# Patient Record
Sex: MALE | Race: WHITE | Employment: FULL TIME | URBAN - METROPOLITAN AREA
[De-identification: names, ages, dates, MRNs, and addresses within clinical notes are randomized per-mention and may not be internally consistent; named-entity substitution may affect disease eponyms.]

---

## 2017-01-09 DIAGNOSIS — E78.00 HYPERCHOLESTEROLEMIA: ICD-10-CM

## 2017-01-09 RX ORDER — ATORVASTATIN CALCIUM 20 MG/1
20 TABLET, FILM COATED ORAL DAILY
Qty: 30 TAB | Refills: 0 | Status: SHIPPED | OUTPATIENT
Start: 2017-01-09 | End: 2017-02-13 | Stop reason: SDUPTHER

## 2017-01-09 RX ORDER — FENOFIBRATE 200 MG/1
200 CAPSULE ORAL
Qty: 30 CAP | Refills: 0 | Status: SHIPPED | OUTPATIENT
Start: 2017-01-09 | End: 2017-02-13 | Stop reason: SDUPTHER

## 2017-01-09 NOTE — TELEPHONE ENCOUNTER
Spoke with pt's wife, informed her that pt has not been seen by his PCP Ender Medrano since 04/2015. He is due for an appointment.  Wife states pt will schedule an appt however he is out of medication and needs asap

## 2017-03-13 DIAGNOSIS — E78.00 HYPERCHOLESTEROLEMIA: ICD-10-CM

## 2017-03-13 RX ORDER — ATORVASTATIN CALCIUM 20 MG/1
TABLET, FILM COATED ORAL
Qty: 30 TAB | Refills: 0 | Status: SHIPPED | OUTPATIENT
Start: 2017-03-13

## 2017-03-13 RX ORDER — FENOFIBRATE 200 MG/1
CAPSULE ORAL
Qty: 30 CAP | Refills: 0 | Status: SHIPPED | OUTPATIENT
Start: 2017-03-13

## 2018-03-26 ENCOUNTER — HOSPITAL ENCOUNTER (OUTPATIENT)
Age: 48
Setting detail: OBSERVATION
Discharge: HOME | End: 2018-03-27
Attending: EMERGENCY MEDICINE | Admitting: FAMILY MEDICINE
Payer: COMMERCIAL

## 2018-03-26 ENCOUNTER — APPOINTMENT (OUTPATIENT)
Dept: GENERAL RADIOLOGY | Age: 48
End: 2018-03-26
Attending: PHYSICIAN ASSISTANT
Payer: COMMERCIAL

## 2018-03-26 DIAGNOSIS — R07.9 CHEST PAIN, UNSPECIFIED TYPE: Primary | ICD-10-CM

## 2018-03-26 LAB
ALBUMIN SERPL-MCNC: 3.8 G/DL (ref 3.5–5)
ALBUMIN/GLOB SERPL: 1 {RATIO} (ref 1.1–2.2)
ALP SERPL-CCNC: 60 U/L (ref 45–117)
ALT SERPL-CCNC: 42 U/L (ref 12–78)
ANION GAP SERPL CALC-SCNC: 6 MMOL/L (ref 5–15)
AST SERPL-CCNC: 32 U/L (ref 15–37)
BASOPHILS # BLD: 0 K/UL (ref 0–0.1)
BASOPHILS NFR BLD: 0 % (ref 0–1)
BILIRUB SERPL-MCNC: 0.2 MG/DL (ref 0.2–1)
BUN SERPL-MCNC: 15 MG/DL (ref 6–20)
BUN/CREAT SERPL: 12 (ref 12–20)
CALCIUM SERPL-MCNC: 8.5 MG/DL (ref 8.5–10.1)
CHLORIDE SERPL-SCNC: 107 MMOL/L (ref 97–108)
CO2 SERPL-SCNC: 26 MMOL/L (ref 21–32)
CREAT SERPL-MCNC: 1.3 MG/DL (ref 0.7–1.3)
D DIMER PPP FEU-MCNC: 0.22 MG/L FEU (ref 0–0.65)
DIFFERENTIAL METHOD BLD: NORMAL
EOSINOPHIL # BLD: 0.1 K/UL (ref 0–0.4)
EOSINOPHIL NFR BLD: 2 % (ref 0–7)
ERYTHROCYTE [DISTWIDTH] IN BLOOD BY AUTOMATED COUNT: 12.9 % (ref 11.5–14.5)
GLOBULIN SER CALC-MCNC: 3.7 G/DL (ref 2–4)
GLUCOSE SERPL-MCNC: 103 MG/DL (ref 65–100)
HCT VFR BLD AUTO: 44.2 % (ref 36.6–50.3)
HGB BLD-MCNC: 14.8 G/DL (ref 12.1–17)
IMM GRANULOCYTES # BLD: 0 K/UL (ref 0–0.04)
IMM GRANULOCYTES NFR BLD AUTO: 0 % (ref 0–0.5)
LYMPHOCYTES # BLD: 1.7 K/UL (ref 0.8–3.5)
LYMPHOCYTES NFR BLD: 21 % (ref 12–49)
MCH RBC QN AUTO: 29.8 PG (ref 26–34)
MCHC RBC AUTO-ENTMCNC: 33.5 G/DL (ref 30–36.5)
MCV RBC AUTO: 89.1 FL (ref 80–99)
MONOCYTES # BLD: 0.7 K/UL (ref 0–1)
MONOCYTES NFR BLD: 9 % (ref 5–13)
NEUTS SEG # BLD: 5.5 K/UL (ref 1.8–8)
NEUTS SEG NFR BLD: 68 % (ref 32–75)
NRBC # BLD: 0 K/UL (ref 0–0.01)
NRBC BLD-RTO: 0 PER 100 WBC
PLATELET # BLD AUTO: 299 K/UL (ref 150–400)
PMV BLD AUTO: 10.7 FL (ref 8.9–12.9)
POTASSIUM SERPL-SCNC: 4.1 MMOL/L (ref 3.5–5.1)
PROT SERPL-MCNC: 7.5 G/DL (ref 6.4–8.2)
RBC # BLD AUTO: 4.96 M/UL (ref 4.1–5.7)
SODIUM SERPL-SCNC: 139 MMOL/L (ref 136–145)
TROPONIN I SERPL-MCNC: <0.04 NG/ML
WBC # BLD AUTO: 8.1 K/UL (ref 4.1–11.1)

## 2018-03-26 PROCEDURE — 94640 AIRWAY INHALATION TREATMENT: CPT

## 2018-03-26 PROCEDURE — 74011250637 HC RX REV CODE- 250/637: Performed by: FAMILY MEDICINE

## 2018-03-26 PROCEDURE — 71046 X-RAY EXAM CHEST 2 VIEWS: CPT

## 2018-03-26 PROCEDURE — 77010033678 HC OXYGEN DAILY

## 2018-03-26 PROCEDURE — 85379 FIBRIN DEGRADATION QUANT: CPT | Performed by: PHYSICIAN ASSISTANT

## 2018-03-26 PROCEDURE — 36415 COLL VENOUS BLD VENIPUNCTURE: CPT | Performed by: PHYSICIAN ASSISTANT

## 2018-03-26 PROCEDURE — 85025 COMPLETE CBC W/AUTO DIFF WBC: CPT | Performed by: PHYSICIAN ASSISTANT

## 2018-03-26 PROCEDURE — 77030029684 HC NEB SM VOL KT MONA -A

## 2018-03-26 PROCEDURE — 84484 ASSAY OF TROPONIN QUANT: CPT | Performed by: PHYSICIAN ASSISTANT

## 2018-03-26 PROCEDURE — 93005 ELECTROCARDIOGRAM TRACING: CPT

## 2018-03-26 PROCEDURE — 99218 HC RM OBSERVATION: CPT

## 2018-03-26 PROCEDURE — 80053 COMPREHEN METABOLIC PANEL: CPT | Performed by: PHYSICIAN ASSISTANT

## 2018-03-26 PROCEDURE — 99284 EMERGENCY DEPT VISIT MOD MDM: CPT

## 2018-03-26 PROCEDURE — 96374 THER/PROPH/DIAG INJ IV PUSH: CPT

## 2018-03-26 PROCEDURE — 74011250637 HC RX REV CODE- 250/637: Performed by: EMERGENCY MEDICINE

## 2018-03-26 PROCEDURE — 94664 DEMO&/EVAL PT USE INHALER: CPT

## 2018-03-26 PROCEDURE — 74011000250 HC RX REV CODE- 250: Performed by: FAMILY MEDICINE

## 2018-03-26 RX ORDER — FENOFIBRATE 145 MG/1
145 TABLET, COATED ORAL DAILY
Status: DISCONTINUED | OUTPATIENT
Start: 2018-03-27 | End: 2018-03-27 | Stop reason: HOSPADM

## 2018-03-26 RX ORDER — SODIUM CHLORIDE 0.9 % (FLUSH) 0.9 %
5-10 SYRINGE (ML) INJECTION AS NEEDED
Status: DISCONTINUED | OUTPATIENT
Start: 2018-03-26 | End: 2018-03-27 | Stop reason: HOSPADM

## 2018-03-26 RX ORDER — VENLAFAXINE HYDROCHLORIDE 37.5 MG/1
75 CAPSULE, EXTENDED RELEASE ORAL
Status: DISCONTINUED | OUTPATIENT
Start: 2018-03-26 | End: 2018-03-27 | Stop reason: HOSPADM

## 2018-03-26 RX ORDER — ACETAMINOPHEN 325 MG/1
650 TABLET ORAL
Status: DISCONTINUED | OUTPATIENT
Start: 2018-03-26 | End: 2018-03-27 | Stop reason: HOSPADM

## 2018-03-26 RX ORDER — SODIUM CHLORIDE 0.9 % (FLUSH) 0.9 %
5-10 SYRINGE (ML) INJECTION EVERY 8 HOURS
Status: DISCONTINUED | OUTPATIENT
Start: 2018-03-26 | End: 2018-03-27 | Stop reason: HOSPADM

## 2018-03-26 RX ORDER — BISOPROLOL FUMARATE 5 MG/1
5 TABLET ORAL DAILY
Status: DISCONTINUED | OUTPATIENT
Start: 2018-03-27 | End: 2018-03-27 | Stop reason: HOSPADM

## 2018-03-26 RX ORDER — NITROGLYCERIN 0.4 MG/1
0.4 TABLET SUBLINGUAL
Status: DISCONTINUED | OUTPATIENT
Start: 2018-03-26 | End: 2018-03-27 | Stop reason: HOSPADM

## 2018-03-26 RX ORDER — IPRATROPIUM BROMIDE AND ALBUTEROL SULFATE 2.5; .5 MG/3ML; MG/3ML
3 SOLUTION RESPIRATORY (INHALATION)
Status: DISCONTINUED | OUTPATIENT
Start: 2018-03-26 | End: 2018-03-27 | Stop reason: HOSPADM

## 2018-03-26 RX ORDER — GUAIFENESIN 100 MG/5ML
324 LIQUID (ML) ORAL
Status: COMPLETED | OUTPATIENT
Start: 2018-03-26 | End: 2018-03-26

## 2018-03-26 RX ORDER — GUAIFENESIN 100 MG/5ML
81 LIQUID (ML) ORAL
Status: DISCONTINUED | OUTPATIENT
Start: 2018-03-26 | End: 2018-03-27 | Stop reason: HOSPADM

## 2018-03-26 RX ORDER — GUAIFENESIN 100 MG/5ML
81 LIQUID (ML) ORAL DAILY
Status: DISCONTINUED | OUTPATIENT
Start: 2018-03-27 | End: 2018-03-26 | Stop reason: SDUPTHER

## 2018-03-26 RX ORDER — ATORVASTATIN CALCIUM 20 MG/1
20 TABLET, FILM COATED ORAL DAILY
Status: DISCONTINUED | OUTPATIENT
Start: 2018-03-27 | End: 2018-03-27 | Stop reason: HOSPADM

## 2018-03-26 RX ADMIN — ASPIRIN 81 MG 81 MG: 81 TABLET ORAL at 22:59

## 2018-03-26 RX ADMIN — VENLAFAXINE HYDROCHLORIDE 75 MG: 37.5 CAPSULE, EXTENDED RELEASE ORAL at 23:06

## 2018-03-26 RX ADMIN — FAMOTIDINE 20 MG: 10 INJECTION INTRAVENOUS at 22:59

## 2018-03-26 RX ADMIN — IPRATROPIUM BROMIDE AND ALBUTEROL SULFATE 3 ML: .5; 3 SOLUTION RESPIRATORY (INHALATION) at 22:45

## 2018-03-26 RX ADMIN — ASPIRIN 81 MG 324 MG: 81 TABLET ORAL at 17:06

## 2018-03-26 RX ADMIN — Medication 10 ML: at 23:03

## 2018-03-26 NOTE — IP AVS SNAPSHOT
[address] [place] [address] 
[phone] Patient: Bronwyn Johnson MRN: [id number] :1970 About your hospitalization You were admitted on:  2018 You last received care in the:  [facility] 4 IMCU You were discharged on:  2018 Why you were hospitalized Your primary diagnosis was:  Chest Pain Follow-up Information Follow up With Details Comments Contact Info Alesha Garcia NP In 1 week post hospitalization follow up [address] [place] [address] 
[phone] Your Scheduled Appointments 2018  8:15 AM EDT [facility] NM 2 DAY CARD REST DOSE with [facility] NM INJ RM 1 [facility] RAD NUC MED ([address]) [address]  
[phone] Patient should report to outpatient registration ([address] [place] [address]) 30 minutes prior to the appointment time unless instructed otherwise. (NOT FOR MRI) Discharge Orders None A check kathy indicates which time of day the medication should be taken. My Medications CHANGE how you take these medications Instructions Each Dose to Equal  
 Morning Noon Evening Bedtime  
 atorvastatin 20 mg tablet Commonly known as:  LIPITOR What changed:  See the new instructions. Your last dose was: Your next dose is: TAKE ONE TABLET BY MOUTH DAILY  
     
   
   
   
  
 fenofibrate micronized 200 mg capsule Commonly known as:  LOFIBRA What changed:  See the new instructions. Your last dose was: Your next dose is: TAKE ONE CAPSULE BY MOUTH EVERY MORNING  
     
   
   
   
  
  
CONTINUE taking these medications Instructions Each Dose to Equal  
 Morning Noon Evening Bedtime  
 aspirin 81 mg chewable tablet Your last dose was: Your next dose is: Take 81 mg by mouth nightly.   
 81 mg  
    
 bisoprolol 5 mg tablet Commonly known as:  Vesta Balloon Your last dose was: Your next dose is: TAKE ONE TABLET BY MOUTH NIGHTLY  
     
   
   
   
  
 venlafaxine-SR 75 mg capsule Commonly known as:  EFFEXOR-XR Your last dose was: Your next dose is: Take 1 Cap by mouth nightly. 75 mg Discharge Instructions Discharge Instructions PATIENT ID: Bobbi Allen MRN: [id number] YOB: 1970 DATE OF ADMISSION: 3/26/2018  4:18 PM   
DATE OF DISCHARGE: 3/27/2018 PRIMARY CARE PROVIDER: Margo Gonsalez NP  
 
ATTENDING PHYSICIAN: Megan Hopkins MD 
DISCHARGING PROVIDER: Megan Hopkins MD   
To contact this individual call [phone] and ask the  to page. If unavailable ask to be transferred the Adult Hospitalist Department. DISCHARGE DIAGNOSES and ADDITIONAL CARE RECOMMENDATIONS:  
-Chest pain. No evidence of heart attack. All est are negative. CONSULTATIONS: IP CONSULT TO HOSPITALIST 
 
PROCEDURES/SURGERIES: * No surgery found * PENDING TEST RESULTS:  
At the time of discharge the following test results are still pending: none FOLLOW UP APPOINTMENTS:  
Follow-up Information Follow up With Details Comments Contact Info Margo Gonsalez NP In 1 week post hospitalization follow up [address] 
[phone] DIET: Regular Diet ACTIVITY: Activity as tolerated DISCHARGE MEDICATIONS: 
 See Medication Reconciliation Form · It is important that you take the medication exactly as they are prescribed. · Keep your medication in the bottles provided by the pharmacist and keep a list of the medication names, dosages, and times to be taken in your wallet. · Do not take other medications without consulting your doctor. NOTIFY YOUR PHYSICIAN FOR ANY OF THE FOLLOWING:  
Fever over 101 degrees for 24 hours. Chest pain, shortness of breath, fever, chills, nausea, vomiting, diarrhea, change in mentation, falling, weakness, bleeding. Severe pain or pain not relieved by medications. Or, any other signs or symptoms that you may have questions about. DISPOSITION: 
x  Home With: 
 OT  PT  [place]  RN  
  
 SNF/Inpatient Rehab/LTAC Independent/assisted living Hospice Other:  
 
 
 
 
Signed: Nolan Holland MD 
3/27/2018 
3:07 PM 
 
  
  
  
Introducing [facility]! Dear Kody Frazier: Thank you for requesting a [employer] account. Our records indicate that you already have an active [employer] account. You can access your account anytime at [email or web address] Did you know that you can access your hospital and ER discharge instructions at any time in [employer]? You can also review all of your test results from your hospital stay or ER visit. Additional Information If you have questions, please visit the Frequently Asked Questions section of the [employer] website at [email or web address] Remember, [employer] is NOT to be used for urgent needs. For medical emergencies, dial 911. Now available from your iPhone and Android! Introducing Krzysztof Casanova As a [facility] patient, I wanted to make you aware of our electronic visit tool called Krzysztof Casanova. [facility] 24/7 allows you to connect within minutes with a medical provider 24 hours a day, seven days a week via a mobile device or tablet or logging into a secure website from your computer. You can access Krzysztof Recinosfin from anywhere in the [place].  
 
A virtual visit might be right for you when you have a simple condition and feel like you just dont want to get out of bed, or cant get away from work for an appointment, when your regular [facility] provider is not available (evenings, weekends or holidays), or when youre out of town and need minor care. Electronic visits cost only $49 and if the [employer] 24/7 provider determines a prescription is needed to treat your condition, one can be electronically transmitted to a nearby pharmacy*. Please take a moment to enroll today if you have not already done so. The enrollment process is free and takes just a few minutes. To enroll, please download the disco volante kirti to your tablet or phone, or visit [email or web address] to enroll on your computer. And, as an [address] [place] [address] patient with a "[employer] account, the results of your visits will be scanned into your electronic medical record and your primary care provider will be able to view the scanned results. We urge you to continue to see your regular [facility] provider for your ongoing medical care. And while your primary care provider may not be the one available when you seek a [email or web address] virtual visit, the peace of mind you get from getting a real diagnosis real time can be priceless. For more information on [email or web address], view our Frequently Asked Questions (FAQs) at [email or web address]. Sincerely, 
 
Kiet Rivera MD 
Chief Medical Officer [employer] *:  certain medications cannot be prescribed via [facility] Providers Seen During Your Hospitalization Provider Specialty Primary office phone Buck Carvalho MD Emergency Medicine [phone] Joanne Lozano MD Hospitalist [phone] Khushbu Gunter MD Internal Medicine [phone] Your Primary Care Physician (PCP) Primary Care Physician Office Phone Office Fax José Miguel Torres [phone] You are allergic to the following Allergen Reactions Buspar (Buspirone) Unable to Obtain Lipitor (Atorvastatin) Unable to Obtain Pcn (Penicillins) Unknown (comments) Patient was told this since his childhood Zocor (Simvastatin) Unable to Obtain Recent Documentation Height Weight BMI Smoking Status 1.803 m 127.2 kg 39.11 kg/m2 Former Smoker Emergency Contacts Name Discharge Info Relation Home Work Mobile Lisseth Alston DISCHARGE CAREGIVER [3] Spouse [3] [phone] Patient Belongings The following personal items are in your possession at time of discharge: 
  Dental Appliances: None  Visual Aid: Glasses, With patient      Home Medications: None   Jewelry: None  Clothing: Belt, Footwear, Pants, Shirt    Other Valuables: Cell Phone Please provide this summary of care documentation to your next provider. Signatures-by signing, you are acknowledging that this After Visit Summary has been reviewed with you and you have received a copy. Patient Signature:  ____________________________________________________________ Date:  ____________________________________________________________  
  
Micki Garcia Provider Signature:  ____________________________________________________________ Date:  ____________________________________________________________

## 2018-03-26 NOTE — IP AVS SNAPSHOT
[address] 
[phone] Patient: Bronwyn Johnson MRN: [id number] :1970 A check kathy indicates which time of day the medication should be taken. My Medications CHANGE how you take these medications Instructions Each Dose to Equal  
 Morning Noon Evening Bedtime  
 atorvastatin 20 mg tablet Commonly known as:  LIPITOR What changed:  See the new instructions. Your last dose was: Your next dose is: TAKE ONE TABLET BY MOUTH DAILY  
     
   
   
   
  
 fenofibrate micronized 200 mg capsule Commonly known as:  LOFIBRA What changed:  See the new instructions. Your last dose was: Your next dose is: TAKE ONE CAPSULE BY MOUTH EVERY MORNING  
     
   
   
   
  
  
CONTINUE taking these medications Instructions Each Dose to Equal  
 Morning Noon Evening Bedtime  
 aspirin 81 mg chewable tablet Your last dose was: Your next dose is: Take 81 mg by mouth nightly. 81 mg  
    
   
   
   
  
 bisoprolol 5 mg tablet Commonly known as:  Aria Patel Your last dose was: Your next dose is: TAKE ONE TABLET BY MOUTH NIGHTLY  
     
   
   
   
  
 venlafaxine-SR 75 mg capsule Commonly known as:  EFFEXOR-XR Your last dose was: Your next dose is: Take 1 Cap by mouth nightly.   
 75 mg

## 2018-03-26 NOTE — ED PROVIDER NOTES
HPI Comments: 49 y.o. male with past medical history significant for GERD, anxiety, asthma, and hypercholesterolemia who presents ambulatory from work with chief complaint of chest pain. Pt reports sudden onset L sided CP radiating down his L arm described as sharp and burning with accompanying lightheadedness and \"feeling hot\" about 2 hours PTA (~1430) while at work typing. Pt reports at its worst, the pain was a 5/10. Pt reports the accompanying symptoms have resolved, but the pain is currently a 2/10. Pt reports he felt the need to cough and was mildly SOB with the pain, but not currently. Pt reports a history of anxiety attacks, notes this does not feel similar to past attacks. Pt denies having similar pain before. Pt reports a history of HTN, high cholesterol, and anxiety. There are no other acute medical concerns at this time. Social hx: Former smoker (Quit 2011); Occasional EtOH use  FMHx: Father had 3 MIs prior to age 72  PCP: Pedro Shipley NP    Note written by Jami Newton, as dictated by Margarette Shell MD 4:51 PM      The history is provided by the patient. No  was used. Past Medical History:   Diagnosis Date    Anxiety 4/23/2010    AR (allergic rhinitis) 4/23/2010    Asthma 4/23/2010    Family history of early CAD 4/23/2010    GERD (gastroesophageal reflux disease) 4/23/2010    Hypercholesterolemia 6/6/02    Obesity 4/23/2010    Smoker        Past Surgical History:   Procedure Laterality Date    HX CHOLECYSTECTOMY  5/03         Family History:   Problem Relation Age of Onset    Lung Disease Mother      COPD    Arthritis-osteo Father     Heart Disease Father     Elevated Lipids Father     Stroke Father     Cancer Father      bone marrow; multiple myeloma       Social History     Social History    Marital status:      Spouse name: N/A    Number of children: N/A    Years of education: N/A     Occupational History    Not on file. Social History Main Topics    Smoking status: Former Smoker     Years: 12.00     Types: Cigarettes     Quit date: 1/1/2011    Smokeless tobacco: Former User      Comment: down to 4 cigarettes a day    Alcohol use Yes      Comment: occ    Drug use: No    Sexual activity: Yes     Partners: Female     Other Topics Concern    Not on file     Social History Narrative         ALLERGIES: Buspar [buspirone]; Lipitor [atorvastatin]; Pcn [penicillins]; and Zocor [simvastatin]    Review of Systems   Constitutional: Negative for activity change, chills and fever. HENT: Negative for nosebleeds, sore throat, trouble swallowing and voice change. Eyes: Negative for visual disturbance. Respiratory: Negative for cough (Resolved) and shortness of breath (Resolved). Cardiovascular: Positive for chest pain. Negative for palpitations. Gastrointestinal: Negative for abdominal pain, constipation, diarrhea and nausea. Genitourinary: Negative for difficulty urinating, dysuria, hematuria and urgency. Musculoskeletal: Negative for back pain, neck pain and neck stiffness. Skin: Negative for color change. Allergic/Immunologic: Negative for immunocompromised state. Neurological: Negative for dizziness, seizures, syncope, weakness, light-headedness (Resolved), numbness and headaches. Psychiatric/Behavioral: Negative for behavioral problems, confusion, hallucinations, self-injury and suicidal ideas. All other systems reviewed and are negative. Vitals:    03/26/18 1449   BP: (!) 145/92   Pulse: 77   Resp: 18   Temp: 97.7 °F (36.5 °C)   SpO2: 97%   Weight: 130.4 kg (287 lb 6.4 oz)   Height: 5' 11\" (1.803 m)            Physical Exam   Constitutional: He is oriented to person, place, and time. He appears well-developed. No distress. Obese. HENT:   Head: Normocephalic and atraumatic. Eyes: Pupils are equal, round, and reactive to light. Neck: Normal range of motion. Neck supple.    Cardiovascular: Normal rate, regular rhythm and normal heart sounds. Exam reveals no gallop and no friction rub. No murmur heard. Pulmonary/Chest: Effort normal and breath sounds normal. No respiratory distress. He has no wheezes. Abdominal: Soft. Bowel sounds are normal. He exhibits no distension. There is no tenderness. There is no rebound and no guarding. Musculoskeletal: Normal range of motion. Neurological: He is alert and oriented to person, place, and time. Skin: Skin is warm. No rash noted. He is not diaphoretic. Psychiatric: He has a normal mood and affect. His behavior is normal. Judgment and thought content normal.   Nursing note and vitals reviewed. Note written by Jami Riggins, as dictated by Benton Girard MD 4:51 PM    [facility]      ED Course     This is a 67-year-old male with past medical history, review of systems, physical exam as above, presenting with complaints of acute onset chest pain, characterize as burning, 5/10, radiating down the left arm, associated with flushing, diaphoresis, shortness of breath, that his since improved to pain of  1-2/10, without associated symptoms. Patient states he has had anxiety attacks in the past, but feel different, endorses cardiac evaluation by stress testing approximately 3 years ago, with normal findings. Patient has history significant for hypertension, hyperlipidemia, former smoker, strong family history of coronary disease. Physical exam remarkable for obese well-appearing middle-aged male in no acute distress, with clear breath sounds, soft nontender abdomen, regular rate and rhythm without murmurs gallops or rubs. Differential includes pulmonary embolism, acute coronary syndrome, pneumonia, anxiety. Plan to obtain CMP, CBC, EKG, cardiac enzymes, d-dimer, and chest x-ray. We will provide pain control, and make a disposition based on the patient's diagnostics and response to therapy.   Patient Heart Score is 5.      5:26 PM    Recommending admission for this patient given his risk factors and presentation. Patient is concerned his insurance did not cover his stay. Will consult case management for assistance in evaluating his insurance and possible transfer to a excepting hospital.      Procedures      CONSULT NOTE:  7:15 PM João Rodriguez MD spoke with Dr. Aj Bernstein, Consult for Hospitalist.  Discussed available diagnostic tests and clinical findings. Dr. Aj Bernstein will see and admit the pt.

## 2018-03-26 NOTE — ED NOTES

## 2018-03-26 NOTE — PROGRESS NOTES
Received consult. Verified with registration that pt has open access [employer] plan.       ROSEMARIE Galeano

## 2018-03-26 NOTE — PROGRESS NOTES
Admission Medication Reconciliation:    Information obtained from: patient    Significant PMH/Disease States:   Past Medical History:   Diagnosis Date    Anxiety 4/23/2010    AR (allergic rhinitis) 4/23/2010    Asthma 4/23/2010    Family history of early CAD 4/23/2010    GERD (gastroesophageal reflux disease) 4/23/2010    Hypercholesterolemia 6/6/02    Obesity 4/23/2010    Smoker        Chief Complaint for this Admission:  chest pain    Allergies:  Buspar [buspirone]; Lipitor [atorvastatin]; Pcn [penicillins]; and Zocor [simvastatin]    Prior to Admission Medications:   Prior to Admission Medications   Prescriptions Last Dose Informant Patient Reported? Taking?   aspirin 81 mg chewable tablet 3/25/2018 at pm  Yes Yes   Sig: Take 81 mg by mouth nightly. atorvastatin (LIPITOR) 20 mg tablet 3/25/2018 at pm  No Yes   Sig: TAKE ONE TABLET BY MOUTH DAILY   Patient taking differently: TAKE ONE TABLET BY MOUTH nightly   bisoprolol (ZEBETA) 5 mg tablet 3/25/2018 at pm  No Yes   Sig: TAKE ONE TABLET BY MOUTH NIGHTLY   fenofibrate micronized (LOFIBRA) 200 mg capsule 3/25/2018 at pm  No Yes   Sig: TAKE ONE CAPSULE BY MOUTH EVERY MORNING   Patient taking differently: TAKE ONE CAPSULE BY MOUTH EVERY evening   venlafaxine-SR (EFFEXOR-XR) 75 mg capsule 3/25/2018 at pm  No Yes   Sig: Take 1 Cap by mouth nightly. Facility-Administered Medications: None       Comments/Recommendations: Medication review done with patient. Removed QVAR inhaler. Patient takes all his medications before bed. Nothing taken today PTA.

## 2018-03-26 NOTE — ED TRIAGE NOTES
Triage: Pt c/o chest tightness, L arm sore and feeling \"flushed\". Denies N/V, fevers.  +SOB, dry cough

## 2018-03-27 ENCOUNTER — APPOINTMENT (OUTPATIENT)
Dept: NUCLEAR MEDICINE | Age: 48
End: 2018-03-27
Attending: HOSPITALIST
Payer: COMMERCIAL

## 2018-03-27 VITALS
HEIGHT: 71 IN | WEIGHT: 280.43 LBS | TEMPERATURE: 98.5 F | SYSTOLIC BLOOD PRESSURE: 148 MMHG | DIASTOLIC BLOOD PRESSURE: 87 MMHG | OXYGEN SATURATION: 99 % | RESPIRATION RATE: 18 BRPM | BODY MASS INDEX: 39.26 KG/M2 | HEART RATE: 73 BPM

## 2018-03-27 LAB
ANION GAP SERPL CALC-SCNC: 9 MMOL/L (ref 5–15)
ATRIAL RATE: 71 BPM
ATTENDING PHYSICIAN, CST07: NORMAL
BASOPHILS # BLD: 0 K/UL (ref 0–0.1)
BASOPHILS NFR BLD: 0 % (ref 0–1)
BUN SERPL-MCNC: 18 MG/DL (ref 6–20)
BUN/CREAT SERPL: 16 (ref 12–20)
CALCIUM SERPL-MCNC: 8.8 MG/DL (ref 8.5–10.1)
CALCULATED P AXIS, ECG09: 21 DEGREES
CALCULATED R AXIS, ECG10: -32 DEGREES
CALCULATED T AXIS, ECG11: 16 DEGREES
CHLORIDE SERPL-SCNC: 108 MMOL/L (ref 97–108)
CHOLEST SERPL-MCNC: 135 MG/DL
CO2 SERPL-SCNC: 23 MMOL/L (ref 21–32)
CREAT SERPL-MCNC: 1.11 MG/DL (ref 0.7–1.3)
DIAGNOSIS, 93000: NORMAL
DIAGNOSIS, 93000: NORMAL
DIFFERENTIAL METHOD BLD: NORMAL
DUKE TM SCORE RESULT, CST14: NORMAL
DUKE TREADMILL SCORE, CST13: NORMAL
ECG INTERP BEFORE EX, CST11: NORMAL
ECG INTERP DURING EX, CST12: NORMAL
EOSINOPHIL # BLD: 0.2 K/UL (ref 0–0.4)
EOSINOPHIL NFR BLD: 2 % (ref 0–7)
ERYTHROCYTE [DISTWIDTH] IN BLOOD BY AUTOMATED COUNT: 12.9 % (ref 11.5–14.5)
FUNCTIONAL CAPACITY, CST17: NORMAL
GLUCOSE SERPL-MCNC: 139 MG/DL (ref 65–100)
HCT VFR BLD AUTO: 39.9 % (ref 36.6–50.3)
HDLC SERPL-MCNC: 32 MG/DL
HDLC SERPL: 4.2 {RATIO} (ref 0–5)
HGB BLD-MCNC: 13.4 G/DL (ref 12.1–17)
IMM GRANULOCYTES # BLD: 0 K/UL (ref 0–0.04)
IMM GRANULOCYTES NFR BLD AUTO: 0 % (ref 0–0.5)
KNOWN CARDIAC CONDITION, CST08: NORMAL
LDLC SERPL CALC-MCNC: 75.8 MG/DL (ref 0–100)
LIPID PROFILE,FLP: NORMAL
LYMPHOCYTES # BLD: 2.1 K/UL (ref 0.8–3.5)
LYMPHOCYTES NFR BLD: 23 % (ref 12–49)
MAX. DIASTOLIC BP, CST04: 92 MMHG
MAX. HEART RATE, CST05: 150 BPM
MAX. SYSTOLIC BP, CST03: 190 MMHG
MCH RBC QN AUTO: 29.7 PG (ref 26–34)
MCHC RBC AUTO-ENTMCNC: 33.6 G/DL (ref 30–36.5)
MCV RBC AUTO: 88.5 FL (ref 80–99)
MONOCYTES # BLD: 1 K/UL (ref 0–1)
MONOCYTES NFR BLD: 11 % (ref 5–13)
NEUTS SEG # BLD: 5.9 K/UL (ref 1.8–8)
NEUTS SEG NFR BLD: 64 % (ref 32–75)
NRBC # BLD: 0 K/UL (ref 0–0.01)
NRBC BLD-RTO: 0 PER 100 WBC
OVERALL BP RESPONSE TO EXERCISE, CST16: NORMAL
OVERALL HR RESPONSE TO EXERCISE, CST15: NORMAL
P-R INTERVAL, ECG05: 208 MS
PEAK EX METS, CST10: 10.1 METS
PLATELET # BLD AUTO: 271 K/UL (ref 150–400)
PMV BLD AUTO: 10.5 FL (ref 8.9–12.9)
POTASSIUM SERPL-SCNC: 3.5 MMOL/L (ref 3.5–5.1)
PROTOCOL NAME, CST01: NORMAL
Q-T INTERVAL, ECG07: 404 MS
QRS DURATION, ECG06: 92 MS
QTC CALCULATION (BEZET), ECG08: 439 MS
RBC # BLD AUTO: 4.51 M/UL (ref 4.1–5.7)
SODIUM SERPL-SCNC: 140 MMOL/L (ref 136–145)
TEST INDICATION, CST09: NORMAL
TRIGL SERPL-MCNC: 136 MG/DL (ref ?–150)
TROPONIN I SERPL-MCNC: <0.04 NG/ML
TSH SERPL DL<=0.05 MIU/L-ACNC: 3.52 UIU/ML (ref 0.36–3.74)
VENTRICULAR RATE, ECG03: 71 BPM
VLDLC SERPL CALC-MCNC: 27.2 MG/DL
WBC # BLD AUTO: 9.2 K/UL (ref 4.1–11.1)

## 2018-03-27 PROCEDURE — 84484 ASSAY OF TROPONIN QUANT: CPT | Performed by: FAMILY MEDICINE

## 2018-03-27 PROCEDURE — 85025 COMPLETE CBC W/AUTO DIFF WBC: CPT | Performed by: FAMILY MEDICINE

## 2018-03-27 PROCEDURE — 36415 COLL VENOUS BLD VENIPUNCTURE: CPT | Performed by: FAMILY MEDICINE

## 2018-03-27 PROCEDURE — 74011000250 HC RX REV CODE- 250: Performed by: FAMILY MEDICINE

## 2018-03-27 PROCEDURE — 94640 AIRWAY INHALATION TREATMENT: CPT

## 2018-03-27 PROCEDURE — 93017 CV STRESS TEST TRACING ONLY: CPT

## 2018-03-27 PROCEDURE — 99218 HC RM OBSERVATION: CPT

## 2018-03-27 PROCEDURE — 80061 LIPID PANEL: CPT | Performed by: FAMILY MEDICINE

## 2018-03-27 PROCEDURE — 84443 ASSAY THYROID STIM HORMONE: CPT | Performed by: FAMILY MEDICINE

## 2018-03-27 PROCEDURE — 80048 BASIC METABOLIC PNL TOTAL CA: CPT | Performed by: FAMILY MEDICINE

## 2018-03-27 PROCEDURE — 96376 TX/PRO/DX INJ SAME DRUG ADON: CPT

## 2018-03-27 PROCEDURE — 74011250637 HC RX REV CODE- 250/637: Performed by: FAMILY MEDICINE

## 2018-03-27 PROCEDURE — 93306 TTE W/DOPPLER COMPLETE: CPT

## 2018-03-27 RX ORDER — SODIUM CHLORIDE 0.9 % (FLUSH) 0.9 %
20 SYRINGE (ML) INJECTION
Status: COMPLETED | OUTPATIENT
Start: 2018-03-27 | End: 2018-03-27

## 2018-03-27 RX ORDER — FAMOTIDINE 20 MG/1
20 TABLET, FILM COATED ORAL EVERY 12 HOURS
Status: DISCONTINUED | OUTPATIENT
Start: 2018-03-27 | End: 2018-03-27 | Stop reason: HOSPADM

## 2018-03-27 RX ADMIN — IPRATROPIUM BROMIDE AND ALBUTEROL SULFATE 3 ML: .5; 3 SOLUTION RESPIRATORY (INHALATION) at 08:41

## 2018-03-27 RX ADMIN — FAMOTIDINE 20 MG: 10 INJECTION INTRAVENOUS at 09:22

## 2018-03-27 RX ADMIN — Medication 10 ML: at 07:19

## 2018-03-27 RX ADMIN — BISOPROLOL FUMARATE 5 MG: 5 TABLET ORAL at 10:30

## 2018-03-27 RX ADMIN — Medication 20 ML: at 11:15

## 2018-03-27 RX ADMIN — IPRATROPIUM BROMIDE AND ALBUTEROL SULFATE 3 ML: .5; 3 SOLUTION RESPIRATORY (INHALATION) at 14:35

## 2018-03-27 RX ADMIN — Medication 10 ML: at 13:49

## 2018-03-27 RX ADMIN — FENOFIBRATE 145 MG: 145 TABLET ORAL at 09:16

## 2018-03-27 RX ADMIN — ATORVASTATIN CALCIUM 20 MG: 20 TABLET, FILM COATED ORAL at 09:16

## 2018-03-27 NOTE — H&P
[address] [facility]  ACUTE CARE HISTORY AND PHYSICAL    Lourdes Carroll.  MR#: [id number]  : 1970  ACCOUNT #: [de-identified]   DATE OF SERVICE: 2018    CHIEF COMPLAINT:  Chest pain. HISTORY OF PRESENT ILLNESS:  Patient is a 79-year-old male with past medical history of GERD, anxiety, asthma, hypercholesterolemia, presents to the hospital complaining of the aforementioned symptoms. Patient reports that he started having some chest pain on the left side of his chest, radiating down to the left forearm, with some \"lightheadedness,\" \"feeling hot\" at around 2:30 this afternoon. The patient reports that he was sitting when he started having the sudden onset of chest pain. Reports that he has history of some panic attacks, but has never had \"symptoms like this with panic attack. \"  The patient got concerned and decided to come to the hospital.  Patient also reports that he has a mild cough, which is nonproductive in nature, associated with the same. Also reports that he had some shortness of breath but, again, denies any exertional components of the same. Patient denies any palpitations, diaphoresis, any exacerbating or alleviating factors, or any heartburn associated with the pain. Patient reports that he had a stress test done about 4-5 years back that was essentially negative. Also reports that he has been taking his medication on a regular basis. Patient denies any headache, blurry vision, sore throat, trouble with swallowing, trouble with speech, any abdominal pain, constipation, diarrhea, hematemesis, melena, hemoptysis, nausea, vomiting, urinary symptoms, focal or generalized neurological weakness, recent travel, sick contacts, falls, injuries, or any other concerns or problems. Patient denies any fever or chills associated with the symptoms. PAST MEDICAL HISTORY:  See above.     HOME MEDICATIONS:  Currently the patient is on Lipitor 20 mg daily, apparently it is listed as an allergy, but patient reports that he is able to take the same; Lofibra 200 mg p.o. daily; Effexor 75 mg nightly; Risperdal 5 mg p.o. nightly; and aspirin 81 mg daily. SOCIAL HISTORY:  Former smoker, used to smoke for 12 years, quit in 2011. Occasional alcohol use. Denies IV drug use. Lives at home. ALLERGIES:  BUSPAR, LIPITOR, PENICILLIN, AND ZOCOR. Again, the patient reports that he has been taking Lipitor since the last year and has had no adverse reactions associated with the same, and he has been taking it on a regular basis. REVIEW OF SYSTEMS:  All systems were reviewed and were found to be essentially negative except for symptoms mentioned above. FAMILY HISTORY:  Mother had a history of COPD. Father had history of osteoarthritis, heart disease, elevated lipids, stroke, and multiple myeloma. PHYSICAL EXAMINATION:  VITAL SIGNS:  Temperature 98, pulse 73, respiration rate 16, blood pressure 140/78, pulse ox 98% on room air. GENERAL:  Alert x3, awake, mildly distressed, pleasant man, appears to be stated age. HEENT:  Pupils were equal and reactive to light. Dry mucous membranes. Tympanic membranes clear. NECK:  Supple. CHEST:  Decreased basilar breath sounds. CORONARY:  S1, S2 were heard. ABDOMEN:  Soft, nontender, nondistended. Bowel sounds are physiological.   EXTREMITIES:  No clubbing, no cyanosis, no edema. NEUROPSYCHIATRIC:  Pleasant mood and affect. Cranial nerves II-XII grossly intact. Sensory grossly within normal limits. DTRs 2+. Strength 5/5. LABORATORY DATA:  White count 8.1, hemoglobin 14.8, hematocrit 44.2, platelets 528. D-dimer 2.22. Sodium 139, potassium 4.1, chloride 107, bicarbonate 26, anion gap 6, glucose 103, BUN 15, creatinine 1.30, calcium 8.5, bilirubin total 0.2, ALT 42, AST 32, alkaline phosphatase 60. Troponin less than 0.04.      X-ray of the chest shows no acute abnormality.       EKG shows normal sinus rhythm, left axis deviation. ASSESSMENT AND PLAN:  1. Chest pain, rule out acute coronary syndrome. Patient will be admitted on a telemetry bed. We will continue the patient on aspirin and statin  We will cycle cardiac enzymes x3 sets. The pain appears to be atypical.  Will get an echocardiogram, telemetry monitoring, nitroglycerin, and further intervention will be per hospital course. If patient rules out, then patient may need a stress test on an outpatient basis, and I do not think the patient needs a stress test on an inpatient basis at this point of time. We will continue to closely monitor, further intervention will be per hospital course. Reassess as needed. EKG shows no acute ST elevation at this point of time. 2.  Asthma. Does not appear to be in exacerbation. Continue to monitor. DuoNeb p.r.n.  3.  Anxiety. Continue home medications. 4.  Hypercholesterolemia. Continue statin. 5.  Gastrointestinal and deep venous thrombosis prophylaxis, the patient on sequential compression devices.       Bessie Looney MD MM / TONIA  D: 03/26/2018 20:20     T: 03/26/2018 21:17  JOB #: [id number]

## 2018-03-27 NOTE — PROGRESS NOTES
Hospital PCP LUCAS GODFREY appointment scheduled with [facility], NP  On Tuesday April 3, 2018  @ 2:00 p.m.  Added to AVS. Jena Garcia CM Specialist

## 2018-03-27 NOTE — ROUTINE PROCESS
TRANSFER - OUT REPORT:    Verbal report given to Angela Kumar RN(name) on [facility]  being transferred to [facility]) for routine progression of care       Report consisted of patients Situation, Background, Assessment and   Recommendations(SBAR). Information from the following report(s) SBAR, ED Summary, [facility] and Recent Results was reviewed with the receiving nurse. Lines:   Peripheral IV 03/26/18 Right Hand (Active)   Site Assessment Clean, dry, & intact 3/26/2018  3:12 PM   Phlebitis Assessment 0 3/26/2018  3:12 PM   Infiltration Assessment 0 3/26/2018  3:12 PM   Dressing Status Clean, dry, & intact 3/26/2018  3:12 PM   Dressing Type 4 X 4;Tape;Transparent 3/26/2018  3:12 PM   Hub Color/Line Status Pink; Infusing;Flushed;Patent 3/26/2018  3:12 PM        Opportunity for questions and clarification was provided.       Patient transported with:   transport

## 2018-03-27 NOTE — PROGRESS NOTES
Bedside shift change report given to Day Clark RN (oncoming nurse) by Jose Pinzon RN (offgoing nurse). Report included the following information SBAR, Kardex, ED Summary, MAR, Accordion, Recent Results and Cardiac Rhythm NSR.

## 2018-03-27 NOTE — PROGRESS NOTES
Spiritual Care Partner Volunteer visited patient in Rm 410 on 3/27/2018.   Documented by:  Chaplain Cantu MDiv, MS, [address]  [address] [place] [address] ([id number])

## 2018-03-27 NOTE — DISCHARGE INSTRUCTIONS
Discharge Instructions       PATIENT ID: Jarod Sharma  MRN: [id number]   YOB: 1970    DATE OF ADMISSION: 3/26/2018  4:18 PM    DATE OF DISCHARGE: 3/27/2018    PRIMARY CARE PROVIDER: Tunde Chowdhury NP     ATTENDING PHYSICIAN: Raúl Guido MD  DISCHARGING PROVIDER: Rúal Guido MD    To contact this individual call [phone] and ask the  to page. If unavailable ask to be transferred the Adult Hospitalist Department. DISCHARGE DIAGNOSES and ADDITIONAL CARE RECOMMENDATIONS:   -Chest pain. No evidence of heart attack. All est are negative. CONSULTATIONS: IP CONSULT TO HOSPITALIST    PROCEDURES/SURGERIES: * No surgery found *    PENDING TEST RESULTS:   At the time of discharge the following test results are still pending: none    FOLLOW UP APPOINTMENTS:   Follow-up Information     Follow up With Details Comments Vannesa Thomas NP In 1 week post hospitalization follow up [address]  [phone]                 DIET: Regular Diet    ACTIVITY: Activity as tolerated        DISCHARGE MEDICATIONS:   See Medication Reconciliation Form    · It is important that you take the medication exactly as they are prescribed. · Keep your medication in the bottles provided by the pharmacist and keep a list of the medication names, dosages, and times to be taken in your wallet. · Do not take other medications without consulting your doctor. NOTIFY YOUR PHYSICIAN FOR ANY OF THE FOLLOWING:   Fever over 101 degrees for 24 hours. Chest pain, shortness of breath, fever, chills, nausea, vomiting, diarrhea, change in mentation, falling, weakness, bleeding. Severe pain or pain not relieved by medications. Or, any other signs or symptoms that you may have questions about. DISPOSITION:  x  Home With:   OT  PT  AN  RN       SNF/Inpatient Rehab/LTAC    Independent/assisted living    Hospice    Other:           Signed:    1100  Swetha Guido MD  3/27/2018  3:07 PM

## 2018-03-27 NOTE — PROGRESS NOTES
[address] March 27, 2018       RE: Sarah Aleman      To Whom It May Concern,    This is to certify that Sarah Aleman has been hospitalized in [facility] from 3/26-3/27,2018 and  may return to work on 3/28/2018. Please feel free to contact my office if you have any questions or concerns. Thank you for your assistance in this matter.       Sincerely,      1100  Swetha Guido MD    [facility] Adult Hospitalist group  [facility] ,2nd floor  [address] [place] A[place]UJOZZ:  RGB:

## 2018-03-27 NOTE — DISCHARGE SUMMARY
Discharge Summary       PATIENT ID: Kulwant Valle  MRN: [id number]   YOB: 1970    DATE OF ADMISSION: 3/26/2018  4:18 PM    DATE OF DISCHARGE: 3/27/2018  PRIMARY CARE PROVIDER: Gifty Wood NP     ATTENDING PHYSICIAN: Bob Benavides MD  DISCHARGING PROVIDER: Bob Benavides MD    To contact this individual call [phone] and ask the  to page. If unavailable ask to be transferred the Adult Hospitalist Department. CONSULTATIONS: IP CONSULT TO HOSPITALIST    PROCEDURES/SURGERIES: * No surgery found *    ADMITTING [address] [place] [address] COURSE:   Patient is a 54-year-old male with past medical history of GERD, anxiety, asthma, hypercholesterolemia, presents to the hospital complaining of the aforementioned symptoms. Patient reports that he started having some chest pain on the left side of his chest, radiating down to the left forearm, with some \"lightheadedness,\" \"feeling hot\" at around 2:30 this afternoon. He had cough and some rattling mid chest.Nebulizer helped. Chest pain:Mi ruled out. He has cough and rattling, and nebs helped,he may have some component of bronchitis. HTN: c/w home regimen. Anxiety d/o:c/w Effexor. Obesity  Body mass index is 39.11 kg/(m^2). Former smoker.               PENDING TEST RESULTS:   At the time of discharge the following test results are still pending: none    FOLLOW UP APPOINTMENTS:    Follow-up Information     Follow up With Details Comments Vannesa Thomas NP In 1 week post hospitalization follow up [address]  [phone]             ADDITIONAL CARE RECOMMENDATIONS:     DIET: Cardiac Diet    ACTIVITY: Activity as tolerated        DISCHARGE MEDICATIONS:  Current Discharge Medication List      CONTINUE these medications which have NOT CHANGED    Details   atorvastatin (LIPITOR) 20 mg tablet TAKE ONE TABLET BY MOUTH DAILY  Qty: 30 Tab, Refills: 0    Associated Diagnoses: Hypercholesterolemia fenofibrate micronized (LOFIBRA) 200 mg capsule TAKE ONE CAPSULE BY MOUTH EVERY MORNING  Qty: 30 Cap, Refills: 0    Associated Diagnoses: Hypercholesterolemia      venlafaxine-SR (EFFEXOR-XR) 75 mg capsule Take 1 Cap by mouth nightly. Qty: 30 Cap, Refills: 0    Associated Diagnoses: Anxiety      bisoprolol (ZEBETA) 5 mg tablet TAKE ONE TABLET BY MOUTH NIGHTLY  Qty: 30 Tab, Refills: 0      aspirin 81 mg chewable tablet Take 81 mg by mouth nightly. NOTIFY YOUR PHYSICIAN FOR ANY OF THE FOLLOWING:   Fever over 101 degrees for 24 hours. Chest pain, shortness of breath, fever, chills, nausea, vomiting, diarrhea, change in mentation, falling, weakness, bleeding. Severe pain or pain not relieved by medications. Or, any other signs or symptoms that you may have questions about. DISPOSITION:   x Home With:   OT  PT  HH  RN       Long term SNF/Inpatient Rehab    Independent/assisted living    Hospice    Other:       PATIENT CONDITION AT DISCHARGE:     Functional status    Poor     Deconditioned    x Independent      Cognition   x  Lucid     Forgetful     Dementia      Catheters/lines (plus indication)    Espinosa     PICC     PEG    x None      Code status    x Full code     DNR    x  PHYSICAL EXAMINATION AT DISCHARGE:     Visit Vitals    /87 (BP 1 Location: Left arm, BP Patient Position: At rest;Supine)    Pulse 73    Temp 98.5 °F (36.9 °C)    Resp 18    Ht 5' 11\" (1.803 m)    Wt 127.2 kg (280 lb 6.8 oz)    SpO2 99%    BMI 39.11 kg/m2      O2 Device: Room air    Temp (24hrs), Av.1 °F (36.7 °C), Min:97.8 °F (36.6 °C), Max:98.5 °F (36.9 °C)    701 -  1900  In: 240 [P.O.:240]  Out: -         GENERAL:  Alert, oriented, cooperative, no apparent distress  HEENT:  Normocephalic, atraumatic, non icteric sclerae, non pallor conjuctivae, EOMs intact, PERRLA. NECK: Supple, trachea midline, no adenopathy, no thyromegally or tenderness, no carotid bruit and no JVD.   LUNGS:   Vesicular breath sounds bilaterally, no added sounds. HEART:   S1 and S2 well heard,RRR,  no murmur, click, rub or gallop. ABDOMEB:   Soft, non-tender. Normoactive bowel sounds. No masses,  No organomegaly. EXTREMETIES:  Atraumatic, acyanotic, no edema  PULSES: 2+ and symmetric all extremities. SKIN:  No rashes or lesions  NEUROLOGY: Alert and oriented to PPT, CNII-XII intact. Motor and sensory exam grossly intact. CHRONIC MEDICAL DIAGNOSES:  Problem List as of 3/27/2018  Date Reviewed: 3/26/2018          Codes Class Noted - Resolved    * (Principal)Chest pain ICD-10-CM: R07.9  ICD-9-CM: 786.50  3/26/2018 - Present        HTN (hypertension) ICD-10-CM: I10  ICD-9-CM: 401.9  3/25/2015 - Present        Persistent disorder of initiating or maintaining sleep ICD-10-CM: G47.00  ICD-9-CM: 307.42  5/24/2013 - Present        Obesity ICD-10-CM: E66.9  ICD-9-CM: 278.00  4/23/2010 - Present        Family history of early CAD ICD-10-CM: Z82.49  ICD-9-CM: V17.3  4/23/2010 - Present        GERD (gastroesophageal reflux disease) ICD-10-CM: K21.9  ICD-9-CM: 530.81  4/23/2010 - Present        Anxiety ICD-10-CM: F41.9  ICD-9-CM: 300.00  4/23/2010 - Present    Overview Signed 4/23/2010  1:07 PM by Nargis Horan             AR (allergic rhinitis) ICD-10-CM: J30.9  ICD-9-CM: 477.9  4/23/2010 - Present        Asthma ICD-10-CM: J45.909  ICD-9-CM: 493.90  4/23/2010 - Present        Headache(784.0) ICD-10-CM: R51  ICD-9-CM: 784.0  4/23/2010 - Present        Hypercholesterolemia ICD-10-CM: E78.00  ICD-9-CM: 272.0  6/6/2002 - Present        RESOLVED: Smoker ICD-10-CM: G45.762  ICD-9-CM: 305.1  Unknown - 10/24/2014                  Signed:    Elle Shi MD  3/27/2018  3:11 PM

## 2018-03-27 NOTE — PROGRESS NOTES
Problem: Falls - Risk of  Goal: *Absence of Falls  Document Gloria Fall Risk and appropriate interventions in the flowsheet. Outcome: Progressing Towards Goal  Bed is in the lowest position and wheels are locked, call bell is within reach, bathroom light is on during evening hours, gripper socks are on and patient has been instructed to call out for assistance if needed. As of now, patient is free from falls and will continue to be monitored.      Fall Risk Interventions:          Medication Interventions: Assess postural VS orthostatic hypotension, Patient to call before getting OOB, Teach patient to arise slowly

## 2018-03-28 ENCOUNTER — APPOINTMENT (OUTPATIENT)
Dept: NUCLEAR MEDICINE | Age: 48
End: 2018-03-28
Attending: HOSPITALIST
Payer: COMMERCIAL

## 2018-03-28 PROCEDURE — A9500 TC99M SESTAMIBI: HCPCS

## 2018-03-28 NOTE — ADT AUTH CERT NOTES
[address]  [facility] [phone]  Tax ID [id number]  [address]   Karla Garcia

## 2021-02-12 ENCOUNTER — HOSPITAL ENCOUNTER (OUTPATIENT)
Dept: PREADMISSION TESTING | Age: 51
Discharge: HOME | End: 2021-02-12
Payer: COMMERCIAL

## 2021-02-12 ENCOUNTER — HOSPITAL ENCOUNTER (OUTPATIENT)
Dept: GENERAL RADIOLOGY | Age: 51
Discharge: HOME | End: 2021-02-12
Attending: UROLOGY
Payer: COMMERCIAL

## 2021-02-12 VITALS
BODY MASS INDEX: 38.27 KG/M2 | OXYGEN SATURATION: 97 % | TEMPERATURE: 98.3 F | WEIGHT: 273.37 LBS | SYSTOLIC BLOOD PRESSURE: 148 MMHG | DIASTOLIC BLOOD PRESSURE: 91 MMHG | HEIGHT: 71 IN | HEART RATE: 85 BPM

## 2021-02-12 LAB
ALBUMIN SERPL-MCNC: 3.9 G/DL (ref 3.5–5)
ALBUMIN/GLOB SERPL: 1.2 {RATIO} (ref 1.1–2.2)
ALP SERPL-CCNC: 71 U/L (ref 45–117)
ALT SERPL-CCNC: 44 U/L (ref 12–78)
ANION GAP SERPL CALC-SCNC: 5 MMOL/L (ref 5–15)
APPEARANCE UR: CLEAR
APTT PPP: 27.3 SEC (ref 22.1–31)
AST SERPL-CCNC: 26 U/L (ref 15–37)
BACTERIA URNS QL MICRO: NEGATIVE /HPF
BASOPHILS # BLD: 0 K/UL (ref 0–0.1)
BASOPHILS NFR BLD: 1 % (ref 0–1)
BILIRUB SERPL-MCNC: 0.2 MG/DL (ref 0.2–1)
BILIRUB UR QL: NEGATIVE
BUN SERPL-MCNC: 14 MG/DL (ref 6–20)
BUN/CREAT SERPL: 11 (ref 12–20)
CALCIUM SERPL-MCNC: 9.1 MG/DL (ref 8.5–10.1)
CHLORIDE SERPL-SCNC: 105 MMOL/L (ref 97–108)
CO2 SERPL-SCNC: 29 MMOL/L (ref 21–32)
COLOR UR: NORMAL
CREAT SERPL-MCNC: 1.29 MG/DL (ref 0.7–1.3)
DIFFERENTIAL METHOD BLD: NORMAL
EOSINOPHIL # BLD: 0.1 K/UL (ref 0–0.4)
EOSINOPHIL NFR BLD: 2 % (ref 0–7)
EPITH CASTS URNS QL MICRO: NORMAL /LPF
ERYTHROCYTE [DISTWIDTH] IN BLOOD BY AUTOMATED COUNT: 12.7 % (ref 11.5–14.5)
GLOBULIN SER CALC-MCNC: 3.2 G/DL (ref 2–4)
GLUCOSE SERPL-MCNC: 89 MG/DL (ref 65–100)
GLUCOSE UR STRIP.AUTO-MCNC: NEGATIVE MG/DL
HCT VFR BLD AUTO: 43.7 % (ref 36.6–50.3)
HGB BLD-MCNC: 14.1 G/DL (ref 12.1–17)
HGB UR QL STRIP: NEGATIVE
HYALINE CASTS URNS QL MICRO: NORMAL /LPF (ref 0–5)
IMM GRANULOCYTES # BLD AUTO: 0 K/UL (ref 0–0.04)
IMM GRANULOCYTES NFR BLD AUTO: 0 % (ref 0–0.5)
INR PPP: 1 (ref 0.9–1.1)
KETONES UR QL STRIP.AUTO: NEGATIVE MG/DL
LEUKOCYTE ESTERASE UR QL STRIP.AUTO: NEGATIVE
LYMPHOCYTES # BLD: 1.7 K/UL (ref 0.8–3.5)
LYMPHOCYTES NFR BLD: 24 % (ref 12–49)
MCH RBC QN AUTO: 28.7 PG (ref 26–34)
MCHC RBC AUTO-ENTMCNC: 32.3 G/DL (ref 30–36.5)
MCV RBC AUTO: 89 FL (ref 80–99)
MONOCYTES # BLD: 0.8 K/UL (ref 0–1)
MONOCYTES NFR BLD: 11 % (ref 5–13)
NEUTS SEG # BLD: 4.4 K/UL (ref 1.8–8)
NEUTS SEG NFR BLD: 62 % (ref 32–75)
NITRITE UR QL STRIP.AUTO: NEGATIVE
NRBC # BLD: 0 K/UL (ref 0–0.01)
NRBC BLD-RTO: 0 PER 100 WBC
PH UR STRIP: 6.5 [PH] (ref 5–8)
PLATELET # BLD AUTO: 318 K/UL (ref 150–400)
PMV BLD AUTO: 10.9 FL (ref 8.9–12.9)
POTASSIUM SERPL-SCNC: 4.1 MMOL/L (ref 3.5–5.1)
PROT SERPL-MCNC: 7.1 G/DL (ref 6.4–8.2)
PROT UR STRIP-MCNC: NEGATIVE MG/DL
PROTHROMBIN TIME: 10.3 SEC (ref 9–11.1)
RBC # BLD AUTO: 4.91 M/UL (ref 4.1–5.7)
RBC #/AREA URNS HPF: NORMAL /HPF (ref 0–5)
SODIUM SERPL-SCNC: 139 MMOL/L (ref 136–145)
SP GR UR REFRACTOMETRY: 1.02 (ref 1–1.03)
THERAPEUTIC RANGE,PTTT: NORMAL SECS (ref 58–77)
UA: UC IF INDICATED,UAUC: NORMAL
UROBILINOGEN UR QL STRIP.AUTO: 0.2 EU/DL (ref 0.2–1)
WBC # BLD AUTO: 7 K/UL (ref 4.1–11.1)
WBC URNS QL MICRO: NORMAL /HPF (ref 0–4)

## 2021-02-12 PROCEDURE — 81001 URINALYSIS AUTO W/SCOPE: CPT

## 2021-02-12 PROCEDURE — 36415 COLL VENOUS BLD VENIPUNCTURE: CPT

## 2021-02-12 PROCEDURE — 85610 PROTHROMBIN TIME: CPT

## 2021-02-12 PROCEDURE — 80053 COMPREHEN METABOLIC PANEL: CPT

## 2021-02-12 PROCEDURE — 85025 COMPLETE CBC W/AUTO DIFF WBC: CPT

## 2021-02-12 PROCEDURE — 85730 THROMBOPLASTIN TIME PARTIAL: CPT

## 2021-02-12 PROCEDURE — 71046 X-RAY EXAM CHEST 2 VIEWS: CPT

## 2021-02-12 PROCEDURE — 93005 ELECTROCARDIOGRAM TRACING: CPT

## 2021-02-12 RX ORDER — DILTIAZEM HYDROCHLORIDE 120 MG/1
120 CAPSULE, EXTENDED RELEASE ORAL
COMMUNITY

## 2021-02-12 RX ORDER — OMEPRAZOLE 40 MG/1
40 CAPSULE, DELAYED RELEASE ORAL DAILY
COMMUNITY

## 2021-02-12 NOTE — PERIOP NOTES
Preoperative instructions reviewed with patient. Patient given SSI infection FAQS sheet. Given two bottles of skin prep chlorhexidine soap; given written and verbal instructions on use. Patient was given the opportunity to ask questions on the information provided. Patient instructed to obtain chest X-ray at [address] [place] [address] upon leaving PAT department. INFORMATION REGARDING COVID 19 TESTING PRIOR TO SURGERY WAS PROVIDED TO THE PATIENT WITH THE OPPORTUNITY FOR QUESTIONS. PATIENT VERBALIZED UNDERSTANDING.

## 2021-02-13 LAB
ATRIAL RATE: 71 BPM
CALCULATED P AXIS, ECG09: 23 DEGREES
CALCULATED R AXIS, ECG10: -35 DEGREES
CALCULATED T AXIS, ECG11: 16 DEGREES
DIAGNOSIS, 93000: NORMAL
P-R INTERVAL, ECG05: 236 MS
Q-T INTERVAL, ECG07: 406 MS
QRS DURATION, ECG06: 106 MS
QTC CALCULATION (BEZET), ECG08: 441 MS
VENTRICULAR RATE, ECG03: 71 BPM

## 2021-02-18 ENCOUNTER — TRANSCRIBE ORDER (OUTPATIENT)
Dept: REGISTRATION | Age: 51
End: 2021-02-18

## 2021-02-18 ENCOUNTER — HOSPITAL ENCOUNTER (OUTPATIENT)
Dept: PREADMISSION TESTING | Age: 51
Discharge: HOME | End: 2021-02-18
Payer: COMMERCIAL

## 2021-02-18 DIAGNOSIS — Z01.812 PRE-PROCEDURE LAB EXAM: ICD-10-CM

## 2021-02-18 DIAGNOSIS — Z01.812 PRE-PROCEDURE LAB EXAM: Primary | ICD-10-CM

## 2021-02-18 PROCEDURE — U0003 INFECTIOUS AGENT DETECTION BY NUCLEIC ACID (DNA OR RNA); SEVERE ACUTE RESPIRATORY SYNDROME CORONAVIRUS 2 (SARS-COV-2) (CORONAVIRUS DISEASE [COVID-19]), AMPLIFIED PROBE TECHNIQUE, MAKING USE OF HIGH THROUGHPUT TECHNOLOGIES AS DESCRIBED BY CMS-2020-01-R: HCPCS

## 2021-02-20 LAB — SARS-COV-2, COV2NT: NOT DETECTED

## 2021-02-21 ENCOUNTER — ANESTHESIA EVENT (OUTPATIENT)
Dept: SURGERY | Age: 51
End: 2021-02-21
Payer: COMMERCIAL

## 2021-02-22 ENCOUNTER — HOSPITAL ENCOUNTER (OUTPATIENT)
Age: 51
Setting detail: OBSERVATION
Discharge: HOME | End: 2021-02-23
Attending: UROLOGY | Admitting: UROLOGY
Payer: COMMERCIAL

## 2021-02-22 ENCOUNTER — ANESTHESIA (OUTPATIENT)
Dept: SURGERY | Age: 51
End: 2021-02-22
Payer: COMMERCIAL

## 2021-02-22 DIAGNOSIS — C61 PROSTATE CANCER (HCC): Primary | ICD-10-CM

## 2021-02-22 PROCEDURE — 74011250637 HC RX REV CODE- 250/637: Performed by: ANESTHESIOLOGY

## 2021-02-22 PROCEDURE — 88309 TISSUE EXAM BY PATHOLOGIST: CPT

## 2021-02-22 PROCEDURE — 74011000250 HC RX REV CODE- 250: Performed by: ANESTHESIOLOGY

## 2021-02-22 PROCEDURE — 36415 COLL VENOUS BLD VENIPUNCTURE: CPT

## 2021-02-22 PROCEDURE — 77030040922 HC BLNKT HYPOTHRM STRY -A

## 2021-02-22 PROCEDURE — 74011000250 HC RX REV CODE- 250: Performed by: NURSE ANESTHETIST, CERTIFIED REGISTERED

## 2021-02-22 PROCEDURE — 77030002966 HC SUT PDS J&J -A: Performed by: UROLOGY

## 2021-02-22 PROCEDURE — 88307 TISSUE EXAM BY PATHOLOGIST: CPT

## 2021-02-22 PROCEDURE — 2709999900 HC NON-CHARGEABLE SUPPLY: Performed by: UROLOGY

## 2021-02-22 PROCEDURE — 74011250636 HC RX REV CODE- 250/636: Performed by: UROLOGY

## 2021-02-22 PROCEDURE — 77030026438 HC STYL ET INTUB CARD -A: Performed by: ANESTHESIOLOGY

## 2021-02-22 PROCEDURE — 77030012893

## 2021-02-22 PROCEDURE — 99218 HC RM OBSERVATION: CPT

## 2021-02-22 PROCEDURE — 76060000036 HC ANESTHESIA 2.5 TO 3 HR: Performed by: UROLOGY

## 2021-02-22 PROCEDURE — 77030008756 HC TU IRR SUC STRY -B: Performed by: UROLOGY

## 2021-02-22 PROCEDURE — 77030016151 HC PROTCTR LNS DFOG COVD -B: Performed by: UROLOGY

## 2021-02-22 PROCEDURE — 77030033730 HC CLP LAPRO ABS LPW COVD -C: Performed by: UROLOGY

## 2021-02-22 PROCEDURE — 74011250636 HC RX REV CODE- 250/636: Performed by: NURSE ANESTHETIST, CERTIFIED REGISTERED

## 2021-02-22 PROCEDURE — 76210000017 HC OR PH I REC 1.5 TO 2 HR: Performed by: UROLOGY

## 2021-02-22 PROCEDURE — 77030008684 HC TU ET CUF COVD -B: Performed by: ANESTHESIOLOGY

## 2021-02-22 PROCEDURE — 77030018832 HC SOL IRR H20 ICUM -A: Performed by: UROLOGY

## 2021-02-22 PROCEDURE — 77030040361 HC SLV COMPR DVT MDII -B: Performed by: UROLOGY

## 2021-02-22 PROCEDURE — 77030022704 HC SUT VLOC COVD -B: Performed by: UROLOGY

## 2021-02-22 PROCEDURE — 76010000877 HC OR TIME 2.5 TO 3HR INTENSV - TIER 2: Performed by: UROLOGY

## 2021-02-22 PROCEDURE — 77030013079 HC BLNKT BAIR HGGR 3M -A: Performed by: ANESTHESIOLOGY

## 2021-02-22 PROCEDURE — 77030020703 HC SEAL CANN DISP INTU -B: Performed by: UROLOGY

## 2021-02-22 PROCEDURE — 77030035277 HC OBTRTR BLDELSS DISP INTU -B: Performed by: UROLOGY

## 2021-02-22 PROCEDURE — 74011250637 HC RX REV CODE- 250/637: Performed by: UROLOGY

## 2021-02-22 PROCEDURE — 74011000250 HC RX REV CODE- 250: Performed by: UROLOGY

## 2021-02-22 PROCEDURE — 77030007955 HC PCH ENDOSC SPEC J&J -B: Performed by: UROLOGY

## 2021-02-22 PROCEDURE — 77030010507 HC ADH SKN DERMBND J&J -B: Performed by: UROLOGY

## 2021-02-22 PROCEDURE — 74011250636 HC RX REV CODE- 250/636: Performed by: ANESTHESIOLOGY

## 2021-02-22 PROCEDURE — 77030031492 HC PRT ACC BLNT AIRSEAL CNMD -B: Performed by: UROLOGY

## 2021-02-22 PROCEDURE — 77030031139 HC SUT VCRL2 J&J -A: Performed by: UROLOGY

## 2021-02-22 PROCEDURE — 77030003578 HC NDL INSUF VERES AMR -B: Performed by: UROLOGY

## 2021-02-22 PROCEDURE — 77030002933 HC SUT MCRYL J&J -A: Performed by: UROLOGY

## 2021-02-22 PROCEDURE — 77030008771 HC TU NG SALEM SUMP -A: Performed by: ANESTHESIOLOGY

## 2021-02-22 RX ORDER — BISOPROLOL FUMARATE 5 MG/1
5 TABLET ORAL
Status: DISCONTINUED | OUTPATIENT
Start: 2021-02-22 | End: 2021-02-22

## 2021-02-22 RX ORDER — DEXAMETHASONE SODIUM PHOSPHATE 4 MG/ML
INJECTION, SOLUTION INTRA-ARTICULAR; INTRALESIONAL; INTRAMUSCULAR; INTRAVENOUS; SOFT TISSUE AS NEEDED
Status: DISCONTINUED | OUTPATIENT
Start: 2021-02-22 | End: 2021-02-22 | Stop reason: HOSPADM

## 2021-02-22 RX ORDER — BUPIVACAINE HYDROCHLORIDE 5 MG/ML
30 INJECTION, SOLUTION EPIDURAL; INTRACAUDAL ONCE
Status: COMPLETED | OUTPATIENT
Start: 2021-02-22 | End: 2021-02-22

## 2021-02-22 RX ORDER — GLYCOPYRROLATE 0.2 MG/ML
INJECTION INTRAMUSCULAR; INTRAVENOUS AS NEEDED
Status: DISCONTINUED | OUTPATIENT
Start: 2021-02-22 | End: 2021-02-22 | Stop reason: HOSPADM

## 2021-02-22 RX ORDER — EPHEDRINE SULFATE/0.9% NACL/PF 50 MG/5 ML
SYRINGE (ML) INTRAVENOUS AS NEEDED
Status: DISCONTINUED | OUTPATIENT
Start: 2021-02-22 | End: 2021-02-22 | Stop reason: HOSPADM

## 2021-02-22 RX ORDER — SODIUM CHLORIDE 0.9 % (FLUSH) 0.9 %
5-40 SYRINGE (ML) INJECTION EVERY 8 HOURS
Status: DISCONTINUED | OUTPATIENT
Start: 2021-02-22 | End: 2021-02-22 | Stop reason: HOSPADM

## 2021-02-22 RX ORDER — SUCCINYLCHOLINE CHLORIDE 20 MG/ML
INJECTION INTRAMUSCULAR; INTRAVENOUS AS NEEDED
Status: DISCONTINUED | OUTPATIENT
Start: 2021-02-22 | End: 2021-02-22 | Stop reason: HOSPADM

## 2021-02-22 RX ORDER — VENLAFAXINE HYDROCHLORIDE 75 MG/1
75 CAPSULE, EXTENDED RELEASE ORAL
COMMUNITY

## 2021-02-22 RX ORDER — ATORVASTATIN CALCIUM 20 MG/1
20 TABLET, FILM COATED ORAL
Status: DISCONTINUED | OUTPATIENT
Start: 2021-02-22 | End: 2021-02-23 | Stop reason: HOSPADM

## 2021-02-22 RX ORDER — KETOROLAC TROMETHAMINE 30 MG/ML
30 INJECTION, SOLUTION INTRAMUSCULAR; INTRAVENOUS EVERY 6 HOURS
Status: COMPLETED | OUTPATIENT
Start: 2021-02-22 | End: 2021-02-23

## 2021-02-22 RX ORDER — KETAMINE HYDROCHLORIDE 10 MG/ML
INJECTION, SOLUTION INTRAMUSCULAR; INTRAVENOUS AS NEEDED
Status: DISCONTINUED | OUTPATIENT
Start: 2021-02-22 | End: 2021-02-22 | Stop reason: HOSPADM

## 2021-02-22 RX ORDER — ROCURONIUM BROMIDE 10 MG/ML
INJECTION, SOLUTION INTRAVENOUS AS NEEDED
Status: DISCONTINUED | OUTPATIENT
Start: 2021-02-22 | End: 2021-02-22 | Stop reason: HOSPADM

## 2021-02-22 RX ORDER — LABETALOL HYDROCHLORIDE 5 MG/ML
20 INJECTION, SOLUTION INTRAVENOUS
Status: COMPLETED | OUTPATIENT
Start: 2021-02-22 | End: 2021-02-22

## 2021-02-22 RX ORDER — MIDAZOLAM HYDROCHLORIDE 1 MG/ML
INJECTION, SOLUTION INTRAMUSCULAR; INTRAVENOUS AS NEEDED
Status: DISCONTINUED | OUTPATIENT
Start: 2021-02-22 | End: 2021-02-22 | Stop reason: HOSPADM

## 2021-02-22 RX ORDER — METOCLOPRAMIDE HYDROCHLORIDE 5 MG/ML
10 INJECTION INTRAMUSCULAR; INTRAVENOUS EVERY 6 HOURS
Status: DISCONTINUED | OUTPATIENT
Start: 2021-02-22 | End: 2021-02-23 | Stop reason: HOSPADM

## 2021-02-22 RX ORDER — DEXTROSE, SODIUM CHLORIDE, AND POTASSIUM CHLORIDE 5; .45; .15 G/100ML; G/100ML; G/100ML
150 INJECTION INTRAVENOUS CONTINUOUS
Status: DISCONTINUED | OUTPATIENT
Start: 2021-02-22 | End: 2021-02-23 | Stop reason: HOSPADM

## 2021-02-22 RX ORDER — DILTIAZEM HYDROCHLORIDE 120 MG/1
120 CAPSULE, EXTENDED RELEASE ORAL
Status: DISCONTINUED | OUTPATIENT
Start: 2021-02-22 | End: 2021-02-22 | Stop reason: SDUPTHER

## 2021-02-22 RX ORDER — ACETAMINOPHEN 325 MG/1
650 TABLET ORAL ONCE
Status: COMPLETED | OUTPATIENT
Start: 2021-02-22 | End: 2021-02-22

## 2021-02-22 RX ORDER — FENTANYL CITRATE 50 UG/ML
25 INJECTION, SOLUTION INTRAMUSCULAR; INTRAVENOUS
Status: DISCONTINUED | OUTPATIENT
Start: 2021-02-22 | End: 2021-02-22 | Stop reason: HOSPADM

## 2021-02-22 RX ORDER — PANTOPRAZOLE SODIUM 40 MG/1
40 TABLET, DELAYED RELEASE ORAL
Status: DISCONTINUED | OUTPATIENT
Start: 2021-02-23 | End: 2021-02-23 | Stop reason: HOSPADM

## 2021-02-22 RX ORDER — SODIUM CHLORIDE, SODIUM LACTATE, POTASSIUM CHLORIDE, CALCIUM CHLORIDE 600; 310; 30; 20 MG/100ML; MG/100ML; MG/100ML; MG/100ML
INJECTION, SOLUTION INTRAVENOUS
Status: DISCONTINUED | OUTPATIENT
Start: 2021-02-22 | End: 2021-02-22 | Stop reason: HOSPADM

## 2021-02-22 RX ORDER — VENLAFAXINE HYDROCHLORIDE 37.5 MG/1
75 CAPSULE, EXTENDED RELEASE ORAL
Status: DISCONTINUED | OUTPATIENT
Start: 2021-02-22 | End: 2021-02-23 | Stop reason: HOSPADM

## 2021-02-22 RX ORDER — DILTIAZEM HYDROCHLORIDE 120 MG/1
120 CAPSULE, COATED, EXTENDED RELEASE ORAL
Status: DISCONTINUED | OUTPATIENT
Start: 2021-02-22 | End: 2021-02-23 | Stop reason: HOSPADM

## 2021-02-22 RX ORDER — HYDROMORPHONE HYDROCHLORIDE 2 MG/ML
INJECTION, SOLUTION INTRAMUSCULAR; INTRAVENOUS; SUBCUTANEOUS AS NEEDED
Status: DISCONTINUED | OUTPATIENT
Start: 2021-02-22 | End: 2021-02-22 | Stop reason: HOSPADM

## 2021-02-22 RX ORDER — HYDROMORPHONE HYDROCHLORIDE 1 MG/ML
0.2 INJECTION, SOLUTION INTRAMUSCULAR; INTRAVENOUS; SUBCUTANEOUS
Status: DISCONTINUED | OUTPATIENT
Start: 2021-02-22 | End: 2021-02-22 | Stop reason: HOSPADM

## 2021-02-22 RX ORDER — PHENYLEPHRINE HCL IN 0.9% NACL 0.4MG/10ML
SYRINGE (ML) INTRAVENOUS AS NEEDED
Status: DISCONTINUED | OUTPATIENT
Start: 2021-02-22 | End: 2021-02-22 | Stop reason: HOSPADM

## 2021-02-22 RX ORDER — HYDROCODONE BITARTRATE AND ACETAMINOPHEN 5; 325 MG/1; MG/1
1 TABLET ORAL
Status: DISCONTINUED | OUTPATIENT
Start: 2021-02-22 | End: 2021-02-23 | Stop reason: HOSPADM

## 2021-02-22 RX ORDER — FENOFIBRATE 145 MG/1
TABLET, COATED ORAL
Status: DISCONTINUED | OUTPATIENT
Start: 2021-02-22 | End: 2021-02-23 | Stop reason: HOSPADM

## 2021-02-22 RX ORDER — ZOLPIDEM TARTRATE 5 MG/1
5 TABLET ORAL
Status: DISCONTINUED | OUTPATIENT
Start: 2021-02-22 | End: 2021-02-23 | Stop reason: HOSPADM

## 2021-02-22 RX ORDER — NEOSTIGMINE METHYLSULFATE 1 MG/ML
INJECTION INTRAVENOUS AS NEEDED
Status: DISCONTINUED | OUTPATIENT
Start: 2021-02-22 | End: 2021-02-22 | Stop reason: HOSPADM

## 2021-02-22 RX ORDER — MIDAZOLAM HYDROCHLORIDE 1 MG/ML
1 INJECTION, SOLUTION INTRAMUSCULAR; INTRAVENOUS AS NEEDED
Status: DISCONTINUED | OUTPATIENT
Start: 2021-02-22 | End: 2021-02-22 | Stop reason: HOSPADM

## 2021-02-22 RX ORDER — ATROPA BELLADONNA AND OPIUM 16.2; 6 MG/1; MG/1
1 SUPPOSITORY RECTAL ONCE
Status: COMPLETED | OUTPATIENT
Start: 2021-02-22 | End: 2021-02-22

## 2021-02-22 RX ORDER — NALOXONE HYDROCHLORIDE 0.4 MG/ML
0.4 INJECTION, SOLUTION INTRAMUSCULAR; INTRAVENOUS; SUBCUTANEOUS AS NEEDED
Status: DISCONTINUED | OUTPATIENT
Start: 2021-02-22 | End: 2021-02-23 | Stop reason: HOSPADM

## 2021-02-22 RX ORDER — SODIUM CHLORIDE 0.9 % (FLUSH) 0.9 %
5-40 SYRINGE (ML) INJECTION AS NEEDED
Status: DISCONTINUED | OUTPATIENT
Start: 2021-02-22 | End: 2021-02-22 | Stop reason: HOSPADM

## 2021-02-22 RX ORDER — FENTANYL CITRATE 50 UG/ML
50 INJECTION, SOLUTION INTRAMUSCULAR; INTRAVENOUS AS NEEDED
Status: DISCONTINUED | OUTPATIENT
Start: 2021-02-22 | End: 2021-02-22 | Stop reason: HOSPADM

## 2021-02-22 RX ORDER — LEVOFLOXACIN 5 MG/ML
500 INJECTION, SOLUTION INTRAVENOUS ONCE
Status: COMPLETED | OUTPATIENT
Start: 2021-02-22 | End: 2021-02-22

## 2021-02-22 RX ORDER — ONDANSETRON 2 MG/ML
4 INJECTION INTRAMUSCULAR; INTRAVENOUS AS NEEDED
Status: DISCONTINUED | OUTPATIENT
Start: 2021-02-22 | End: 2021-02-22 | Stop reason: HOSPADM

## 2021-02-22 RX ORDER — SODIUM CHLORIDE 0.9 % (FLUSH) 0.9 %
5-40 SYRINGE (ML) INJECTION AS NEEDED
Status: DISCONTINUED | OUTPATIENT
Start: 2021-02-22 | End: 2021-02-23 | Stop reason: HOSPADM

## 2021-02-22 RX ORDER — HYDROMORPHONE HYDROCHLORIDE 1 MG/ML
1 INJECTION, SOLUTION INTRAMUSCULAR; INTRAVENOUS; SUBCUTANEOUS
Status: DISCONTINUED | OUTPATIENT
Start: 2021-02-22 | End: 2021-02-23 | Stop reason: HOSPADM

## 2021-02-22 RX ORDER — ONDANSETRON 2 MG/ML
INJECTION INTRAMUSCULAR; INTRAVENOUS AS NEEDED
Status: DISCONTINUED | OUTPATIENT
Start: 2021-02-22 | End: 2021-02-22 | Stop reason: HOSPADM

## 2021-02-22 RX ORDER — SODIUM CHLORIDE, SODIUM LACTATE, POTASSIUM CHLORIDE, CALCIUM CHLORIDE 600; 310; 30; 20 MG/100ML; MG/100ML; MG/100ML; MG/100ML
50 INJECTION, SOLUTION INTRAVENOUS CONTINUOUS
Status: DISCONTINUED | OUTPATIENT
Start: 2021-02-22 | End: 2021-02-22 | Stop reason: HOSPADM

## 2021-02-22 RX ORDER — LIDOCAINE HYDROCHLORIDE 20 MG/ML
INJECTION, SOLUTION EPIDURAL; INFILTRATION; INTRACAUDAL; PERINEURAL AS NEEDED
Status: DISCONTINUED | OUTPATIENT
Start: 2021-02-22 | End: 2021-02-22 | Stop reason: HOSPADM

## 2021-02-22 RX ORDER — PROPOFOL 10 MG/ML
INJECTION, EMULSION INTRAVENOUS AS NEEDED
Status: DISCONTINUED | OUTPATIENT
Start: 2021-02-22 | End: 2021-02-22 | Stop reason: HOSPADM

## 2021-02-22 RX ORDER — FENTANYL CITRATE 50 UG/ML
INJECTION, SOLUTION INTRAMUSCULAR; INTRAVENOUS AS NEEDED
Status: DISCONTINUED | OUTPATIENT
Start: 2021-02-22 | End: 2021-02-22 | Stop reason: HOSPADM

## 2021-02-22 RX ORDER — LIDOCAINE HYDROCHLORIDE 10 MG/ML
0.1 INJECTION, SOLUTION EPIDURAL; INFILTRATION; INTRACAUDAL; PERINEURAL AS NEEDED
Status: DISCONTINUED | OUTPATIENT
Start: 2021-02-22 | End: 2021-02-22 | Stop reason: HOSPADM

## 2021-02-22 RX ORDER — SODIUM CHLORIDE 0.9 % (FLUSH) 0.9 %
5-40 SYRINGE (ML) INJECTION EVERY 8 HOURS
Status: DISCONTINUED | OUTPATIENT
Start: 2021-02-22 | End: 2021-02-23 | Stop reason: HOSPADM

## 2021-02-22 RX ORDER — HYDROCODONE BITARTRATE AND ACETAMINOPHEN 5; 325 MG/1; MG/1
1 TABLET ORAL
Qty: 10 TAB | Refills: 0 | Status: SHIPPED | OUTPATIENT
Start: 2021-02-22 | End: 2021-02-24

## 2021-02-22 RX ADMIN — LIDOCAINE HYDROCHLORIDE 100 MG: 20 INJECTION, SOLUTION EPIDURAL; INFILTRATION; INTRACAUDAL; PERINEURAL at 11:37

## 2021-02-22 RX ADMIN — FENTANYL CITRATE 50 MCG: 50 INJECTION, SOLUTION INTRAMUSCULAR; INTRAVENOUS at 11:37

## 2021-02-22 RX ADMIN — KETAMINE HYDROCHLORIDE 50 MG: 10 INJECTION, SOLUTION INTRAMUSCULAR; INTRAVENOUS at 11:37

## 2021-02-22 RX ADMIN — HYDROMORPHONE HYDROCHLORIDE 0.5 MG: 2 INJECTION, SOLUTION INTRAMUSCULAR; INTRAVENOUS; SUBCUTANEOUS at 13:39

## 2021-02-22 RX ADMIN — POTASSIUM CHLORIDE, DEXTROSE MONOHYDRATE AND SODIUM CHLORIDE 150 ML/HR: 150; 5; 450 INJECTION, SOLUTION INTRAVENOUS at 14:55

## 2021-02-22 RX ADMIN — ONDANSETRON HYDROCHLORIDE 4 MG: 2 INJECTION, SOLUTION INTRAMUSCULAR; INTRAVENOUS at 13:22

## 2021-02-22 RX ADMIN — ACETAMINOPHEN 650 MG: 325 TABLET ORAL at 11:17

## 2021-02-22 RX ADMIN — DEXAMETHASONE SODIUM PHOSPHATE 4 MG: 4 INJECTION, SOLUTION INTRAMUSCULAR; INTRAVENOUS at 11:47

## 2021-02-22 RX ADMIN — HYDROMORPHONE HYDROCHLORIDE 0.5 MG: 2 INJECTION, SOLUTION INTRAMUSCULAR; INTRAVENOUS; SUBCUTANEOUS at 12:32

## 2021-02-22 RX ADMIN — FENTANYL CITRATE 50 MCG: 50 INJECTION, SOLUTION INTRAMUSCULAR; INTRAVENOUS at 11:29

## 2021-02-22 RX ADMIN — FENOFIBRATE 145 MG: 145 TABLET, FILM COATED ORAL at 21:00

## 2021-02-22 RX ADMIN — Medication 10 MG: at 12:50

## 2021-02-22 RX ADMIN — PROPOFOL 150 MG: 10 INJECTION, EMULSION INTRAVENOUS at 11:37

## 2021-02-22 RX ADMIN — ROCURONIUM BROMIDE 10 MG: 10 SOLUTION INTRAVENOUS at 11:37

## 2021-02-22 RX ADMIN — LABETALOL HYDROCHLORIDE 20 MG: 5 INJECTION INTRAVENOUS at 15:21

## 2021-02-22 RX ADMIN — SODIUM CHLORIDE, POTASSIUM CHLORIDE, SODIUM LACTATE AND CALCIUM CHLORIDE: 600; 310; 30; 20 INJECTION, SOLUTION INTRAVENOUS at 11:28

## 2021-02-22 RX ADMIN — FENTANYL CITRATE 25 MCG: 50 INJECTION, SOLUTION INTRAMUSCULAR; INTRAVENOUS at 14:52

## 2021-02-22 RX ADMIN — SODIUM CHLORIDE, POTASSIUM CHLORIDE, SODIUM LACTATE AND CALCIUM CHLORIDE 50 ML/HR: 600; 310; 30; 20 INJECTION, SOLUTION INTRAVENOUS at 11:26

## 2021-02-22 RX ADMIN — MIDAZOLAM 2 MG: 1 INJECTION INTRAMUSCULAR; INTRAVENOUS at 11:29

## 2021-02-22 RX ADMIN — ATORVASTATIN CALCIUM 20 MG: 20 TABLET, FILM COATED ORAL at 21:01

## 2021-02-22 RX ADMIN — SODIUM CHLORIDE, POTASSIUM CHLORIDE, SODIUM LACTATE AND CALCIUM CHLORIDE 50 ML/HR: 600; 310; 30; 20 INJECTION, SOLUTION INTRAVENOUS at 11:23

## 2021-02-22 RX ADMIN — VENLAFAXINE HYDROCHLORIDE 75 MG: 37.5 CAPSULE, EXTENDED RELEASE ORAL at 21:00

## 2021-02-22 RX ADMIN — POTASSIUM CHLORIDE, DEXTROSE MONOHYDRATE AND SODIUM CHLORIDE 150 ML/HR: 150; 5; 450 INJECTION, SOLUTION INTRAVENOUS at 21:06

## 2021-02-22 RX ADMIN — DILTIAZEM HYDROCHLORIDE 120 MG: 120 CAPSULE, COATED, EXTENDED RELEASE ORAL at 21:00

## 2021-02-22 RX ADMIN — SUCCINYLCHOLINE CHLORIDE 160 MG: 20 INJECTION, SOLUTION INTRAMUSCULAR; INTRAVENOUS at 11:37

## 2021-02-22 RX ADMIN — Medication 40 MCG: at 12:47

## 2021-02-22 RX ADMIN — Medication 10 MG: at 13:47

## 2021-02-22 RX ADMIN — GLYCOPYRROLATE 0.4 MG: 0.2 INJECTION, SOLUTION INTRAMUSCULAR; INTRAVENOUS at 13:46

## 2021-02-22 RX ADMIN — ROCURONIUM BROMIDE 40 MG: 10 SOLUTION INTRAVENOUS at 11:41

## 2021-02-22 RX ADMIN — Medication 10 ML: at 18:06

## 2021-02-22 RX ADMIN — LEVOFLOXACIN 500 MG: 5 INJECTION, SOLUTION INTRAVENOUS at 11:41

## 2021-02-22 RX ADMIN — HYDROCODONE BITARTRATE AND ACETAMINOPHEN 1 TABLET: 5; 325 TABLET ORAL at 20:59

## 2021-02-22 RX ADMIN — Medication 10 ML: at 22:00

## 2021-02-22 RX ADMIN — METOCLOPRAMIDE 10 MG: 5 INJECTION, SOLUTION INTRAMUSCULAR; INTRAVENOUS at 18:05

## 2021-02-22 RX ADMIN — NEOSTIGMINE METHYLSULFATE 3 MG: 1 INJECTION, SOLUTION INTRAVENOUS at 13:46

## 2021-02-22 RX ADMIN — KETOROLAC TROMETHAMINE 30 MG: 30 INJECTION, SOLUTION INTRAMUSCULAR at 18:05

## 2021-02-22 NOTE — DISCHARGE INSTRUCTIONS
Dr. Kathy Paris. Tigre Shukal PROSTATECTOMY  POST OPERATIVE INSTRUCTIONS    FOLLOW-UP VISIT    ? Dr. Brijesh Washington or his associate will see you back in the office in 1 week. ? At that time your final pathology report will be reviewed with you.  ? Your Espinosa catheter will be evaluated for removal at that time. ? You will be re-educated on male kegel exercises to strengthen your pelvic muscles. This exercise is felt to hasten your recovery of urinary continence. Virtually everyone has leakage of urine upon removal of the catheter and recovery time is variable and everyone is different. Please be patient. ? Please bring an adult urinary pad (such as Depend Guards) with you on this appointment. DISCHARGE MEDICATIONS    ? Upon discharge you will be given prescriptions for pain control (Hydrocodone)   ? Most patients experience only minimal discomfort after this minimally invasive surgery. We recommend using Tylenol (acetaminophen) for pain first and reserve the narcotic pain medication as an alternative as it tends to cause constipation. ? You may resume your normal medications upon discharge from the hospital with the exception of any blood thinning products (such as coumadin or aspirin). ACTIVITY    ? Please refrain from driving for the 1st week after your surgery. ? You may shower upon discharge from the hospital. Avoid bathtubs, hot tubs or swimming pools during 1st 2 weeks. ? It is important to be mobile during your recovery period. Avoid sitting in one position for longer than 45 minutes to 1 hour. Walking and climbing stairs are OK. Be careful not to overdo it. ? Avoid any heavy lifting or strenuous activity for the first 2 weeks. ? Most patients ease into normal activities (including employment) after 2 weeks of recuperation. ? Most patients resume driving by the 2nd week. Please use your best judgment. CATHETER CARE    ?  Keep your [facility] urinary catheter below the level of your bladder at all times otherwise it may not drain properly. ? The leg bag can be fitted under your trousers for daytime use. The larger overnight bag will hold more urine and is best reserved for bedtime use.  ? Minimal care of this unit is required. Make sure there is slack on the catheter between your penis and the drainage bag. This should not be on any tension. Empty the bag when full. You may disconnect the urinary drainage bag when showering and let the catheter drain freely. ? A topical antibiotic ointment applied to the catheter as inserts into the penis will reduce discomfort from the catheter. This can be obtained over the counter at your local pharmacy. ? Do not perform the male kegel exercises while the urinary catheter is in place. CARE OF YOUR INCISION SITES    ? Application of ointments or creams to the incision sites is not recommended. ? The adhesive clear wound dressing will slough off naturally in 5 - 10 days  ? Do not pick at or apply ointments/medications to the adhesive dressing  ? Do not scrub, soak or expose incisions to prolonged moisture. MALE KEGEL EXERCISES    ? Once your [facility] urinary catheter is removed it will be safe to start these exercises. ? Your surgery removed your prostate and affected your secondary urinary control mechanisms. Your external sphincter must now take all the responsibility for keeping you dry and continent. It will take time and effort to strengthen this mechanism. How do you do Kegel exercises? The first step is to find the right muscles. Imagine that you are trying to stop yourself from passing gas. Squeeze the muscles you would use. If you sense a \"pulling\" feeling, those are the right muscles for pelvic exercises. It is important not to squeeze other muscles at the same time and not to hold your breath. Also, be careful not to tighten your stomach, leg, or buttock muscles.  Squeezing the wrong muscles can put more pressure on your bladder control muscles. Squeeze just the pelvic muscles. Repeat, but do not overdo it. Pull in the pelvic muscles and hold for a count of 3. Then relax for a count of 3. Work up to [employer] of 10 repeats. Be patient. Do not give up. It takes just 5 minutes, three to five times a day. Your bladder control may not improve for 3 to 6 weeks, although most people notice an improvement after a few weeks. DIET     Please avoid carbonated beverages and any other gas producing foods (such as flour, beans, or broccoli) for the 1st week or so. Small meals are best until bowel habits return to normal.    THINGS YOU MAY ENCOUNTER AFTER SURGERY    ? Bruising around incision sites. Not at all uncommon and should not alarm you. This will resolve with time. ? Abdominal distention, constipation or bloating. Make sure you are following the dietary instructions. If you dont have a bowel movement or pass gas or are feeling uncomfortable 24 hours after surgery, try taking Milk of Magnesia as directed on the bottle. If after two doses of Milk of Magnesia, you still have not had a bowel movement, it is safe to use a Dulcolax suppository (available over the counter at your local pharmacy). ? Scrotal/Penile swelling and bruising. This is quite common and should not alarm you. It may appear immediately after surgery or may start 4 -5 days after surgery. It should resolve in about 7 - 14 days. You may try elevating your scrotum with a small towel or washcloth when lying down. ? Bloody drainage around [place] catheter or in the urine. This is especially common after increased activity or following a bowel movement. Do not be alarmed. Resting for a short period and drinking plenty of fluids usually improves the situation. ? Leaking urine around Washington catheter. This is not unusual.  The catheter is not perfect, but most of the urine should flow through the catheter into the drainage bag.  ? Bladder spasms. It is not uncommon with the catheter in and even after the catheter comes out to have bladder spasms. You may feel mild to severe bladder pain or cramping. You may also experience the sudden urge to urinate or a burning sensation when you urinate. Call your MD if this persists without relief. ? Perineal pain (pain between your rectum and scrotum)/Testicular discomfort. This may last for several weeks after surgery, but it will resolve. Call your MD if the pain medication does not alleviate this. ? Lower legs/ankle swelling. This is not abnormal with it occurs in both legs and should not alarm you. It should resolve in about 7 - 14 days. Elevation of your legs while sitting will help. CONTACT MD IMMEDIATELY IF YOU ARE EXPERIENCING ANY OF THE FOLLOWING SYMPTOMS:    ? Oral Temperature over 101° F.  ? Urine stops draining from Espinosa into drainage bag.  ? Any pain not relieved by pain medication prescribed. ? Nausea/Vomiting. ? Large amount of blood clots in urine that are blocking the catheter from draining. ? Bladder spasms that are not relieved with pain medication. ? Uneven swelling of legs or ankles. ? Redness and/or large amount of swelling around your incision sites. ? Excessive bleeding or drainage from your incision sites.         [address]  [phone]

## 2021-02-22 NOTE — ROUTINE PROCESS
TRANSFER - IN REPORT: 
 
Verbal report received from Katharine Pastor RN(name) on [facility]  being received from PACU (unit) for routine post - op Report consisted of patients Situation, Background, Assessment and  
Recommendations(SBAR). Information from the following report(s) SBAR, Kardex, Intake/Output, MAR and Recent Results was reviewed with the receiving nurse. Opportunity for questions and clarification was provided. Assessment completed upon patients arrival to unit and care assumed.

## 2021-02-22 NOTE — BRIEF OP NOTE
Brief Postoperative Note    Patient: Laine Jefferson  YOB: 1970  MRN: [id number]    Date of Procedure: 2/22/2021     Pre-Op Diagnosis: PROSTATE CANCER    Post-Op Diagnosis: Same as preoperative diagnosis.       Procedure(s):  DAVINCI RADICAL ROBOTIC ASSISTED LAPAROSCOPIC PROSTATECTOMY, BILATERAL PELVIC LYMPH NODE DISSECTION    Surgeon(s):  Lynnette Real MD    Surgical Assistant: Surg Asst-1: Adelina MESA    Anesthesia: General     Estimated Blood Loss (mL): less than 201     Complications: None    Specimens:   ID Type Source Tests Collected by Time Destination   1 : PROSTATE Fresh Prostate  Lynnette Real MD 2/22/2021 1339 Pathology   2 : LEFT PELVIC LYMPH NODE Fresh Lymph Node  Lynnette Real MD 2/22/2021 1339 Pathology   3 : RIGHT PELVIC LYMPH NODE Fresh Lymph Node  Lynnette Real MD 2/22/2021 1340 Pathology        Implants: * No implants in log *    Drains: * No LDAs found *    Findings: PCA    Electronically Signed by Gabe Dawn MD on 2/22/2021 at 1:53 PM

## 2021-02-22 NOTE — ANESTHESIA POSTPROCEDURE EVALUATION
Post-Anesthesia Evaluation and Assessment    Patient: Juanita Mayes MRN: [id number]  SSN: [id number]    YOB: 1970  Age: 45 y.o. Sex: male      I have evaluated the patient and they are stable and ready for discharge from the PACU. Cardiovascular Function/Vital Signs  Visit Vitals  BP (!) 141/84 (BP 1 Location: Right upper arm, BP Patient Position: At rest)   Pulse 91   Temp 36.7 °C (98 °F)   Resp 15   Ht 5' 11\" (1.803 m)   Wt 124 kg (273 lb 5 oz)   SpO2 99%   BMI 38.12 kg/m²       Patient is status post General anesthesia for Procedure(s):  [address], BILATERAL PELVIC LYMPH NODE DISSECTION. Nausea/Vomiting: None    Postoperative hydration reviewed and adequate. Pain:  Pain Scale 1: Numeric (0 - 10) (02/22/21 1047)  Pain Intensity 1: 0 (02/22/21 1047)   Managed    Neurological Status:   Neuro (WDL): Within Defined Limits (02/22/21 1055)   At baseline    Mental Status, Level of Consciousness: Alert and  oriented to person, place, and time    Pulmonary Status:   O2 Device: Nasal cannula (02/22/21 1411)   Adequate oxygenation and airway patent    Complications related to anesthesia: None    Post-anesthesia assessment completed.  No concerns    Signed By: Alejandra Iglesias MD     February 22, 2021

## 2021-02-22 NOTE — OP NOTES
OPERATIVE REPORT      PREOPERATIVE DIAGNOSIS: Adenocarcinoma of the prostate. POSTOPERATIVE DIAGNOSIS: Adenocarcinoma of the prostate. OPERATIVE PROCEDURE  DaVinci robotic-assisted laparoscopic radical prostatectomy. Right Nerve sparing    Bilateral Lymph node dissection    SURGEON: Elizabeth Nuno MD    ASSISTANT: Nursing staff    ANESTHESIA: General endotracheal.    COMPLICATIONS: None. EBL: 100cc    INDICATIONS: T2 G9 PCA    DESCRIPTION OF PROCEDURE: After informed consent was obtained, the patient  was given appropriate IV antibiotic prophylaxis in the holding area. The  patient was then brought to the operative theatre, where he received  general anesthesia. The patient was carefully placed in a low lithotomy,  Trendelenburg position. All pressure points were padded appropriately. The patient's abdomen and genitalia were shaved, prepared and draped in the  usual sterile fashion. A Espinosa catheter was introduced into the bladder  transurethrally. A supraumbilical incision was made with a #15 scalpel blade. Veress needle placed into the peritoneal cavity and position confirmed with saline irrigation. Veress needle connected to C02 gas to generate a pneumoperitoneum of 15mm H20 pressure,  followed by introduction of the camera port and camera through this incision site. The  remaining ports were placed under camera vision. Two robotic arm ports were  placed, flanking the umbilicus at the lateral border of the rectus  abdominus muscle. The 4th arm robotic port was placed in the right lower  abdominal quadrant and the accessory port was placed in the left lower  abdominal quadrant. The robot was then docked and the procedure ensued at  the surgical console. The bladder was released from the anterior pelvis by transection of the  medial umbilical ligaments and urachal remnant. The space of Retzius was  defined and anterior prostatic fat was dissected off and removed.  The  endopelvic fascia was sharply incised from the prostatic base to the apex. Pubo-prostatic ligaments were taken down sharply as well. The dorsal venous  complex was doubly ligated with interrupted 1-0 Vicryl suture. The bladder  neck was identified and subsequently transected anteriorly to expose the  Espinosa catheter. The Espinosa catheter was retracted to complete the bladder  neck transaction posteriorly. This dissection continued posteriorly to the  level of the vas ampullae and seminal vesicles. These were dissected out  and lifted anteriorly. Posterior Denonvilliers fascia was incised  transversely to begin the dissection of the posterior aspect of the  prostate off the rectum and pre-rectal fat. The vascular pedicles of the  prostate were transected with selective arterial pinpoint cautery hemostasis. The lateral prostatic fascia was incised from the prostatic base to the apex bilaterally to begin  the delicate nerve-sparing portion of the procedure. Athermic and  atraumatic dissection was carefully performed to release both cavernous  neurovascular bundles from the posterior lateral aspect of the prostate. The dorsal venous complex was transected just beyond the apex of the  prostate. The urethra was then transected and the prostate specimen was  placed into a retrieval bag and positioned in the left paracolic gutter for  later extraction. The pelvis was then copiously irrigated with saline and  inspected for significant bleeding or injury. None was seen. The  vesico-urethral anastomosis was performed with double-armed 3-0 v-lock suture in  a running fashion. A new Espinosa catheter was anchored transurethrally. The robot was de-docked and all trocars were removed  under vision to confirm hemostasis. The supraumbilical incision was  lengthened to remove the specimen. The abdominal fascia in this area was  approximated with 1-0 PDS in a figure-of-eight fashion. All incision sites  were injected with 0.25% Marcaine.  Subcuticular closure was performed with  3-0 Monocryl and skin approximation with Dermabond. The patient was  repositioned supine and awakened, extubated and transferred to the recovery  room in good condition. Addendum:    1. Anterior approach used. 2. Endopelvic fascia incised. 3. No median lobe identified on bladder neck transection. 4. Watertight vesicourethral anastomosis confirmed. 5. Wider resection on left (non nerve sparing)  6. Attention was then taken to expose the obturator lymph node packet. A bilateral pelvic lymph node dissection was performed along the sushil-medial aspect of the external iliac vein towards the pelvic side wall. Posterior limits defined by the obturator neurovascular structures. Surgical clips applied at the pubic bone distally and the bifurcation of the iliac vessels proximally. The specimen was placed in the retrieval bag with the prostate.

## 2021-02-22 NOTE — PERIOP NOTES
0.9% NORMAL SALINE, 1 LITER, USED PRN ON STERILE FIELD.    1 LITER STERILE WATER USED PRN VIA SUCTION .

## 2021-02-22 NOTE — ANESTHESIA PREPROCEDURE EVALUATION
Relevant Problems   No relevant active problems       Anesthetic History   No history of anesthetic complications            Review of Systems / Medical History  Patient summary reviewed, nursing notes reviewed and pertinent labs reviewed    Pulmonary            Asthma        Neuro/Psych   Within defined limits           Cardiovascular    Hypertension          Hyperlipidemia    Exercise tolerance: >4 METS     GI/Hepatic/Renal     GERD           Endo/Other        Obesity     Other Findings              Physical Exam    Airway  Mallampati: II  TM Distance: 4 - 6 cm  Neck ROM: normal range of motion   Mouth opening: Normal     Cardiovascular    Rhythm: regular  Rate: normal         Dental  No notable dental hx       Pulmonary  Breath sounds clear to auscultation               Abdominal  Abdominal exam normal       Other Findings            Anesthetic Plan    ASA: 2  Anesthesia type: general          Induction: Intravenous  Anesthetic plan and risks discussed with: Patient

## 2021-02-22 NOTE — PERIOP NOTES
Patient: Tiffani Schrader MRN: [id number]  SSN: [id number]   YOB: 1970  Age: 45 y.o. Sex: male     Patient is status post Procedure(s):  DAVINCI RADICAL ROBOTIC ASSISTED LAPAROSCOPIC PROSTATECTOMY, BILATERAL PELVIC LYMPH NODE DISSECTION.     Surgeon(s) and Role:     * Gabe Campbell MD - Primary    Local/Dose/Irrigation: 0.5% BUPIVACAINE AND B&O SUPP                  Peripheral IV 02/22/21 Right Hand (Active)       Peripheral IV 02/22/21 Left Wrist (Active)            Airway - Endotracheal Tube 02/22/21 Oral (Active)                   Dressing/Packing:  Incision 02/22/21 Abdomen-Dressing/Treatment: Surgical glue (02/22/21 1300)    Splint/Cast:  ]    Other:

## 2021-02-22 NOTE — PERIOP NOTES
TRANSFER - OUT REPORT:    Verbal report given to KIRILL VO(name) on Car Velez  being transferred to 5 E(unit) for routine post - op       Report consisted of patients Situation, Background, Assessment and   Recommendations(SBAR). Time Pre op antibiotic given:Y  Anesthesia Stop time: Y  Espinosa Present on Transfer to floor:Y  Order for Espinosa on Chart:Y  Discharge Prescriptions with Chart:N    Information from the following report(s) SBAR was reviewed with the receiving nurse. Opportunity for questions and clarification was provided. Is the patient on 02? YES       L/Min 2       Other     Is the patient on a monitor? NO    Is the nurse transporting with the patient? NO    Surgical Waiting Area notified of patient's transfer from PACU?  YES      The following personal items collected during your admission accompanied patient upon transfer:   Dental Appliance: Dental Appliances: None  Vision:    Hearing Aid:    Jewelry:    Clothing:    Other Valuables:    Valuables sent to safe:

## 2021-02-22 NOTE — H&P
UROLOGY HISTORY AND PHYSICAL    Patient: Odell Mcclain MRN: [id number]  SSN: [id number]    YOB: 1970  Age: 45 y.o. Sex: male          Date of Encounter:  February 22, 2021  Pre-existing [facility] Patient:            Assessment/Plan:  Prostate cancer. Robotic radical prostatectomy. History of Present Illness:  Patient is a 45 y.o. male. He presents with prostate cancer. Past Medical History: Allergies   Allergen Reactions    Buspar [Buspirone] Unable to Obtain    Lipitor [Atorvastatin] Unable to Obtain    Pcn [Penicillins] Unknown (comments)     Patient was told this since his childhood    Zocor [Simvastatin] Unable to Obtain      Prior to Admission medications    Medication Sig Start Date End Date Taking? Authorizing Provider   dilTIAZem ER [facility]) 120 mg capsule Take 120 mg by mouth nightly. Provider, Historical   omeprazole (PRILOSEC) 40 mg capsule Take 40 mg by mouth daily. Provider, Historical   atorvastatin (LIPITOR) 20 mg tablet TAKE ONE TABLET BY MOUTH DAILY  Patient taking differently: TAKE ONE TABLET BY MOUTH nightly 3/13/17   Marline Hart NP   fenofibrate micronized (LOFIBRA) 200 mg capsule TAKE ONE CAPSULE BY MOUTH EVERY MORNING  Patient taking differently: nightly. 3/13/17   Marline Hart NP   venlafaxine-SR [facility] 75 mg capsule Take 1 Cap by mouth nightly. 2/15/17   Marline Hart NP   bisoprolol (ZEBETA) 5 mg tablet TAKE ONE TABLET BY MOUTH NIGHTLY 2/15/17   Marline Hart NP   aspirin 81 mg chewable tablet Take 81 mg by mouth nightly. Provider, Historical     PMHx:  has a past medical history of Anxiety (4/23/2010), AR (allergic rhinitis) (4/23/2010), Asthma (4/23/2010), Cancer ([facility]) (2020), Family history of early CAD (4/23/2010), GERD (gastroesophageal reflux disease) (4/23/2010), Hypercholesterolemia (6/6/02), Hypertension, Obesity (4/23/2010), and Smoker.   PSurgHx:  has a past surgical history that includes hx cholecystectomy (5/03) and hx tonsillectomy. PSocHx:  reports that he quit smoking about 10 years ago. His smoking use included cigarettes. He has a 6.00 pack-year smoking history. He has never used smokeless tobacco. He reports current alcohol use. He reports current drug use. Drug: Marijuana. ROS:  negative other than above.     Physical Exam:            General:    appears nontoxic                     Skin:  no clubbing, cyanosis, edema                HEENT:  NCAT, O/P Clear        Throat/Neck:  supple, no LAD                 Chest[de-identified]  CTA      Heart[de-identified]  Regular rate and rhythm             Abdomen/Flank[de-identified]  No CVAT, non-tender soft abdomen, no masses             Bladder[de-identified]  Bladder not palpable     Lymph nodes:  no Cervical, supraclavicular, and axillary LAD     Lab Results   Component Value Date/Time    WBC 7.0 02/12/2021 04:03 PM    HCT 43.7 02/12/2021 04:03 PM    PLATELET 703 42/53/2293 04:03 PM    Sodium 139 02/12/2021 04:03 PM    Potassium 4.1 02/12/2021 04:03 PM    Chloride 105 02/12/2021 04:03 PM    CO2 29 02/12/2021 04:03 PM    BUN 14 02/12/2021 04:03 PM    Creatinine 1.29 02/12/2021 04:03 PM    Glucose 89 02/12/2021 04:03 PM    Calcium 9.1 02/12/2021 04:03 PM    INR 1.0 02/12/2021 04:03 PM       UA:   Lab Results   Component Value Date/Time    Color YELLOW/STRAW 02/12/2021 04:03 PM    Appearance CLEAR 02/12/2021 04:03 PM    Specific gravity 1.018 02/12/2021 04:03 PM    pH (UA) 6.5 02/12/2021 04:03 PM    Protein Negative 02/12/2021 04:03 PM    Glucose Negative 02/12/2021 04:03 PM    Ketone Negative 02/12/2021 04:03 PM    Bilirubin Negative 02/12/2021 04:03 PM    Urobilinogen 0.2 02/12/2021 04:03 PM    Nitrites Negative 02/12/2021 04:03 PM    Leukocyte Esterase Negative 02/12/2021 04:03 PM    Epithelial cells FEW 02/12/2021 04:03 PM    Bacteria Negative 02/12/2021 04:03 PM    WBC 0-4 02/12/2021 04:03 PM    RBC 0-5 02/12/2021 04:03 PM       Cultures:   Xrays:     Signed By: Michel Starkey MD  - February 22, 2021

## 2021-02-23 VITALS
TEMPERATURE: 98.7 F | HEIGHT: 71 IN | DIASTOLIC BLOOD PRESSURE: 73 MMHG | RESPIRATION RATE: 18 BRPM | SYSTOLIC BLOOD PRESSURE: 123 MMHG | OXYGEN SATURATION: 94 % | WEIGHT: 273.31 LBS | HEART RATE: 78 BPM | BODY MASS INDEX: 38.26 KG/M2

## 2021-02-23 PROCEDURE — 74011250637 HC RX REV CODE- 250/637: Performed by: UROLOGY

## 2021-02-23 PROCEDURE — 99218 HC RM OBSERVATION: CPT

## 2021-02-23 PROCEDURE — 74011250636 HC RX REV CODE- 250/636: Performed by: UROLOGY

## 2021-02-23 PROCEDURE — 77030040831 HC BAG URINE DRNG MDII -A

## 2021-02-23 PROCEDURE — 77030012865 HC BG URIN LEG MDII -A

## 2021-02-23 RX ADMIN — POTASSIUM CHLORIDE, DEXTROSE MONOHYDRATE AND SODIUM CHLORIDE 150 ML/HR: 150; 5; 450 INJECTION, SOLUTION INTRAVENOUS at 03:21

## 2021-02-23 RX ADMIN — METOCLOPRAMIDE 10 MG: 5 INJECTION, SOLUTION INTRAMUSCULAR; INTRAVENOUS at 00:36

## 2021-02-23 RX ADMIN — PANTOPRAZOLE SODIUM 40 MG: 40 TABLET, DELAYED RELEASE ORAL at 06:30

## 2021-02-23 RX ADMIN — KETOROLAC TROMETHAMINE 30 MG: 30 INJECTION, SOLUTION INTRAMUSCULAR at 00:36

## 2021-02-23 RX ADMIN — HYDROCODONE BITARTRATE AND ACETAMINOPHEN 1 TABLET: 5; 325 TABLET ORAL at 03:21

## 2021-02-23 RX ADMIN — METOCLOPRAMIDE 10 MG: 5 INJECTION, SOLUTION INTRAMUSCULAR; INTRAVENOUS at 06:30

## 2021-02-23 RX ADMIN — KETOROLAC TROMETHAMINE 30 MG: 30 INJECTION, SOLUTION INTRAMUSCULAR at 11:23

## 2021-02-23 RX ADMIN — KETOROLAC TROMETHAMINE 30 MG: 30 INJECTION, SOLUTION INTRAMUSCULAR at 06:30

## 2021-02-23 RX ADMIN — Medication 10 ML: at 06:39

## 2021-02-23 NOTE — PROGRESS NOTES
Page placed to 's office about PTA meds. Patient takes venelafaxine at home and not bisoprolol. 7:57 PM  Spoke 's nurse who stated it was okay to discontinue bisoprolol and add the patients at home venelafazine.

## 2021-02-23 NOTE — PROGRESS NOTES
Admission Medication Reconciliation:        Spoke with patient and his wife on her cell-phone @ [phone], unable to speak with patient face to face at this time due to general isolation precautions related to COVID-19 pandemic.   Patient stated that he only takes five pills daily (all in the evening) as now noted on PTA med list. Not certain why drugs which I have attempted to remove from PTA med list persist, so, herewith is the complete list:  · Venlafaxine SR 75 mg nightly  · Atorvastatin 20 mg nightly  · Diltiazem  mg nightly  · Fenofibrate 200 mg nightly  · Omeprazole 40 mg nightly    ++++++++++++++++++++++++++++++++++++++++++++++++++++++++++  Interview included questions regarding use of:  • PTA medications including prescription/OTC, vitamins, supplements, inhaled, topical, injectable, otic and ophthalmic medications    Medication changes (since last review):  Added:  • Venlafaxine: this was inadvertently removed from PTA med list, patient states is current medication and has been taking for years    Deleted:  • Bisoprolol: states that this was replaced by Diltiazem  • Aspirin    Thank you for allowing me to participate in the care of your patient.    Doris Chan PharmD, RN # [phone]     ¹[facility] pharmacy benefit data reflects medications filled and processed through the patient's insurance, however   this data does NOT capture whether the medication was picked up or is currently being taken by the patient.    Allergies:  Buspar [buspirone], Lipitor [atorvastatin], Pcn [penicillins], and Zocor [simvastatin]    Significant PMH/Disease States:   Past Medical History:   Diagnosis Date   • Anxiety 4/23/2010   • AR (allergic rhinitis) 4/23/2010   • Asthma 4/23/2010   • Cancer (HCC) 2020    PROSTATE   • Family history of early CAD 4/23/2010   • GERD (gastroesophageal reflux disease) 4/23/2010   • Hypercholesterolemia 6/6/02   • Hypertension    • Obesity 4/23/2010   • Smoker      Chief Complaint for  this Admission:  No chief complaint on file. Prior to Admission Medications:   Prior to Admission Medications           Please contact the main inpatient pharmacy with any questions or concerns at [phone] and we will direct you to the clinical pharmacist covering this patient's care while in-house.    DAYLIN Goodwin

## 2021-02-23 NOTE — PROGRESS NOTES
Bedside shift change report given to Jackson Jones RN (oncoming nurse) by Trena Soni (offgoing nurse). Report included the following information SBAR and Kardex.

## 2021-02-23 NOTE — ROUTINE PROCESS
Bedside shift change report given to Silvano Arnett (oncoming nurse) by Nadege Cody RN (offgoing nurse). Report included the following information SBAR, Kardex, Intake/Output, MAR and Recent Results.

## 2021-02-23 NOTE — PROGRESS NOTES
8:06 AM  Patient ambulated with RN this am, complained of \"wooziness from the meds\" in chair that resolved upon returning to bed. Denied any pain this am.    11:36 AM  Patient ambulated 1x lap in the halls and returned to chair. Leg bag teaching done and he had no further questions at this time.

## 2021-02-23 NOTE — PROGRESS NOTES
Urology Progress Note    Admit Date:  2/22/2021    Admit Dx: Prostate cancer [facility]) Komal Dougherty        SUBJECTIVE:     Elliot Rivero is ambulatory.         OBJECTIVE:     Vitals:  Patient Vitals for the past 8 hrs:   BP Temp Pulse Resp SpO2   02/23/21 0819 123/73 98.7 °F (37.1 °C) 78 18 94 %   02/23/21 0455 123/76 97.8 °F (36.6 °C) 77 18 96 %       Intake and Output:     Last shift  02/21 1901 - 02/23 0700  In: 2360 [P.O.:340; I.V.:1900]  Out: 2975 [Urine:2925]     Last 8 hours  02/23 0701 - 02/23 1900  In: -   Out: 800 [Urine:800]    Drain Output (last 8hr):         Physical Exam: Abdomen Soft ND    Labs:  CBC:   Lab Results   Component Value Date/Time    HGB 14.1 02/12/2021 04:03 PM    HCT 43.7 02/12/2021 04:03 PM     BMP:   Lab Results   Component Value Date/Time    BUN 14 02/12/2021 04:03 PM    Creatinine 1.29 02/12/2021 04:03 PM             Assessment:     Procedure(s):  DAVINCI RADICAL ROBOTIC ASSISTED LAPAROSCOPIC PROSTATECTOMY, BILATERAL PELVIC LYMPH NODE DISSECTION    Doing well    Plan:     Home today              Signed By: Catherine Kapadia MD - February 23, 2021

## 2021-02-23 NOTE — DISCHARGE SUMMARY
Urology Discharge Summary    Patient: Luis Chairez MRN: [id number]  SSN: [id number]    YOB: 1970  Age: 45 y.o. Sex: male               ADMISSION:  to Mami Soliz MD by Leo Iglesias MD  2/22[facility]2021 [facility]: [facility]  2/23/2021 DISCHARGE DIAGNOSIS:  Same  CONSULTS: None  PROCEDURES: POD# 1 Day Post-Op Procedure(s):  DAVINCI RADICAL ROBOTIC ASSISTED LAPAROSCOPIC PROSTATECTOMY, BILATERAL PELVIC LYMPH NODE DISSECTION    RECENT LABS:  CBC:   Lab Results   Component Value Date/Time    HGB 14.1 02/12/2021 04:03 PM    HCT 43.7 02/12/2021 04:03 PM       BMP:   Lab Results   Component Value Date/Time    BUN 14 02/12/2021 04:03 PM    Creatinine 1.29 02/12/2021 04:03 PM          HOSPITAL COURSE: Uncomplicated. Condition on Discharge  Good  COMPLICATIONS: None  DISCHARGE TO:     Home  FOLLOWUP: one week        Current Discharge Medication List      START taking these medications    Details   HYDROcodone-acetaminophen (NORCO) 5-325 mg per tablet Take 1 Tab by mouth every four (4) hours as needed for Pain for up to 2 days. Max Daily Amount: 6 Tabs. Qty: 10 Tab, Refills: 0    Associated Diagnoses: Prostate cancer ([facility] [address]         CONTINUE these medications which have NOT CHANGED    Details   dilTIAZem ER (TIAZAC) 120 mg capsule Take 120 mg by mouth nightly. omeprazole (PRILOSEC) 40 mg capsule Take 40 mg by mouth daily.       atorvastatin (LIPITOR) 20 mg tablet TAKE ONE TABLET BY MOUTH DAILY  Qty: 30 Tab, Refills: 0    Associated Diagnoses: Hypercholesterolemia      fenofibrate micronized (LOFIBRA) 200 mg capsule TAKE ONE CAPSULE BY MOUTH EVERY MORNING  Qty: 30 Cap, Refills: 0    Associated Diagnoses: Hypercholesterolemia               Leo Iglesias MD 2/23/2021 12:02 PM

## 2021-05-17 ENCOUNTER — HOSPITAL ENCOUNTER (OUTPATIENT)
Dept: RADIATION THERAPY | Age: 51
Discharge: HOME | End: 2021-05-17

## 2021-05-17 DIAGNOSIS — C61 PROSTATE CANCER (HCC): Primary | ICD-10-CM

## 2021-05-17 DIAGNOSIS — R97.21 INCREASING PROSTATE SPECIFIC ANTIGEN (PSA) LEVEL AFTER TREATMENT FOR MALIGNANT NEOPLASM OF PROSTATE: ICD-10-CM

## 2021-05-27 ENCOUNTER — HOSPITAL ENCOUNTER (OUTPATIENT)
Dept: PET IMAGING | Age: 51
Discharge: HOME | End: 2021-05-27
Attending: RADIOLOGY
Payer: COMMERCIAL

## 2021-05-27 VITALS — HEIGHT: 71 IN | WEIGHT: 265 LBS | BODY MASS INDEX: 37.1 KG/M2

## 2021-05-27 DIAGNOSIS — C61 PROSTATE CANCER (HCC): ICD-10-CM

## 2021-05-27 DIAGNOSIS — R97.21 INCREASING PROSTATE SPECIFIC ANTIGEN (PSA) LEVEL AFTER TREATMENT FOR MALIGNANT NEOPLASM OF PROSTATE: ICD-10-CM

## 2021-05-27 PROCEDURE — 78815 PET IMAGE W/CT SKULL-THIGH: CPT

## 2021-05-27 RX ORDER — SODIUM CHLORIDE 0.9 % (FLUSH) 0.9 %
10 SYRINGE (ML) INJECTION
Status: COMPLETED | OUTPATIENT
Start: 2021-05-27 | End: 2021-05-27

## 2021-05-27 RX ADMIN — Medication 10 ML: at 13:27

## 2021-05-28 DIAGNOSIS — R97.21 INCREASING PROSTATE SPECIFIC ANTIGEN (PSA) LEVEL AFTER TREATMENT FOR MALIGNANT NEOPLASM OF PROSTATE: ICD-10-CM

## 2021-05-28 DIAGNOSIS — C61 PROSTATE CANCER (HCC): Primary | ICD-10-CM

## 2021-06-17 ENCOUNTER — HOSPITAL ENCOUNTER (OUTPATIENT)
Dept: MRI IMAGING | Age: 51
Discharge: HOME | End: 2021-06-17
Attending: RADIOLOGY
Payer: COMMERCIAL

## 2021-06-17 ENCOUNTER — HOSPITAL ENCOUNTER (OUTPATIENT)
Dept: RADIATION THERAPY | Age: 51
Discharge: HOME | End: 2021-06-17

## 2021-06-17 PROCEDURE — 72197 MRI PELVIS W/O & W/DYE: CPT

## 2021-06-17 PROCEDURE — 74011000258 HC RX REV CODE- 258: Performed by: RADIOLOGY

## 2021-06-17 PROCEDURE — 74011250636 HC RX REV CODE- 250/636: Performed by: RADIOLOGY

## 2021-06-17 PROCEDURE — A9585 GADOBUTROL INJECTION: HCPCS | Performed by: RADIOLOGY

## 2021-06-17 RX ORDER — SODIUM CHLORIDE 0.9 % (FLUSH) 0.9 %
10 SYRINGE (ML) INJECTION
Status: COMPLETED | OUTPATIENT
Start: 2021-06-17 | End: 2021-06-17

## 2021-06-17 RX ADMIN — Medication 10 ML: at 21:17

## 2021-06-17 RX ADMIN — GADOBUTROL 12 ML: 604.72 INJECTION INTRAVENOUS at 21:17

## 2021-06-17 RX ADMIN — SODIUM CHLORIDE 100 ML: 900 INJECTION, SOLUTION INTRAVENOUS at 21:17

## 2021-07-06 ENCOUNTER — HOSPITAL ENCOUNTER (OUTPATIENT)
Dept: RADIATION THERAPY | Age: 51
Discharge: HOME | End: 2021-07-06

## 2021-07-07 ENCOUNTER — HOSPITAL ENCOUNTER (OUTPATIENT)
Dept: RADIATION THERAPY | Age: 51
Discharge: HOME | End: 2021-07-07

## 2021-07-08 ENCOUNTER — HOSPITAL ENCOUNTER (OUTPATIENT)
Dept: RADIATION THERAPY | Age: 51
Discharge: HOME | End: 2021-07-08

## 2021-07-09 ENCOUNTER — HOSPITAL ENCOUNTER (OUTPATIENT)
Dept: RADIATION THERAPY | Age: 51
Discharge: HOME | End: 2021-07-09

## 2021-07-12 ENCOUNTER — HOSPITAL ENCOUNTER (OUTPATIENT)
Dept: RADIATION THERAPY | Age: 51
Discharge: HOME | End: 2021-07-12

## 2021-07-13 ENCOUNTER — HOSPITAL ENCOUNTER (OUTPATIENT)
Dept: RADIATION THERAPY | Age: 51
Discharge: HOME | End: 2021-07-13

## 2021-07-14 ENCOUNTER — HOSPITAL ENCOUNTER (OUTPATIENT)
Dept: RADIATION THERAPY | Age: 51
Discharge: HOME | End: 2021-07-14

## 2021-07-15 ENCOUNTER — HOSPITAL ENCOUNTER (OUTPATIENT)
Dept: RADIATION THERAPY | Age: 51
Discharge: HOME | End: 2021-07-15

## 2021-07-16 ENCOUNTER — HOSPITAL ENCOUNTER (OUTPATIENT)
Dept: RADIATION THERAPY | Age: 51
Discharge: HOME | End: 2021-07-16

## 2021-07-19 ENCOUNTER — HOSPITAL ENCOUNTER (OUTPATIENT)
Dept: RADIATION THERAPY | Age: 51
Discharge: HOME | End: 2021-07-19

## 2021-07-20 ENCOUNTER — HOSPITAL ENCOUNTER (OUTPATIENT)
Dept: RADIATION THERAPY | Age: 51
Discharge: HOME | End: 2021-07-20

## 2021-07-21 ENCOUNTER — HOSPITAL ENCOUNTER (OUTPATIENT)
Dept: RADIATION THERAPY | Age: 51
Discharge: HOME | End: 2021-07-21

## 2021-07-22 ENCOUNTER — HOSPITAL ENCOUNTER (OUTPATIENT)
Dept: RADIATION THERAPY | Age: 51
Discharge: HOME | End: 2021-07-22

## 2021-07-23 ENCOUNTER — HOSPITAL ENCOUNTER (OUTPATIENT)
Dept: RADIATION THERAPY | Age: 51
Discharge: HOME | End: 2021-07-23

## 2021-07-26 ENCOUNTER — HOSPITAL ENCOUNTER (OUTPATIENT)
Dept: RADIATION THERAPY | Age: 51
Discharge: HOME | End: 2021-07-26

## 2021-07-27 ENCOUNTER — HOSPITAL ENCOUNTER (OUTPATIENT)
Dept: RADIATION THERAPY | Age: 51
Discharge: HOME | End: 2021-07-27

## 2021-07-28 ENCOUNTER — HOSPITAL ENCOUNTER (OUTPATIENT)
Dept: RADIATION THERAPY | Age: 51
Discharge: HOME | End: 2021-07-28

## 2021-07-29 ENCOUNTER — HOSPITAL ENCOUNTER (OUTPATIENT)
Dept: RADIATION THERAPY | Age: 51
Discharge: HOME | End: 2021-07-29

## 2021-07-30 ENCOUNTER — HOSPITAL ENCOUNTER (OUTPATIENT)
Dept: RADIATION THERAPY | Age: 51
Discharge: HOME | End: 2021-07-30

## 2021-08-02 ENCOUNTER — HOSPITAL ENCOUNTER (OUTPATIENT)
Dept: RADIATION THERAPY | Age: 51
Discharge: HOME | End: 2021-08-02

## 2021-08-03 ENCOUNTER — HOSPITAL ENCOUNTER (OUTPATIENT)
Dept: RADIATION THERAPY | Age: 51
Discharge: HOME | End: 2021-08-03

## 2021-08-04 ENCOUNTER — HOSPITAL ENCOUNTER (OUTPATIENT)
Dept: RADIATION THERAPY | Age: 51
Discharge: HOME | End: 2021-08-04

## 2021-08-05 ENCOUNTER — HOSPITAL ENCOUNTER (OUTPATIENT)
Dept: RADIATION THERAPY | Age: 51
Discharge: HOME | End: 2021-08-05

## 2021-08-06 ENCOUNTER — HOSPITAL ENCOUNTER (OUTPATIENT)
Dept: RADIATION THERAPY | Age: 51
Discharge: HOME | End: 2021-08-06

## 2021-08-09 ENCOUNTER — HOSPITAL ENCOUNTER (OUTPATIENT)
Dept: RADIATION THERAPY | Age: 51
Discharge: HOME | End: 2021-08-09

## 2021-08-10 ENCOUNTER — HOSPITAL ENCOUNTER (OUTPATIENT)
Dept: RADIATION THERAPY | Age: 51
Discharge: HOME | End: 2021-08-10

## 2021-08-11 ENCOUNTER — HOSPITAL ENCOUNTER (OUTPATIENT)
Dept: RADIATION THERAPY | Age: 51
Discharge: HOME | End: 2021-08-11

## 2021-08-12 ENCOUNTER — HOSPITAL ENCOUNTER (OUTPATIENT)
Dept: RADIATION THERAPY | Age: 51
Discharge: HOME | End: 2021-08-12

## 2021-08-13 ENCOUNTER — HOSPITAL ENCOUNTER (OUTPATIENT)
Dept: RADIATION THERAPY | Age: 51
Discharge: HOME | End: 2021-08-13

## 2021-08-16 ENCOUNTER — HOSPITAL ENCOUNTER (OUTPATIENT)
Dept: RADIATION THERAPY | Age: 51
Discharge: HOME | End: 2021-08-16

## 2021-08-17 ENCOUNTER — HOSPITAL ENCOUNTER (OUTPATIENT)
Dept: RADIATION THERAPY | Age: 51
Discharge: HOME | End: 2021-08-17

## 2021-08-18 ENCOUNTER — HOSPITAL ENCOUNTER (OUTPATIENT)
Dept: RADIATION THERAPY | Age: 51
Discharge: HOME | End: 2021-08-18

## 2021-08-19 ENCOUNTER — HOSPITAL ENCOUNTER (OUTPATIENT)
Dept: RADIATION THERAPY | Age: 51
Discharge: HOME | End: 2021-08-19

## 2021-08-20 ENCOUNTER — HOSPITAL ENCOUNTER (OUTPATIENT)
Dept: RADIATION THERAPY | Age: 51
Discharge: HOME | End: 2021-08-20

## 2021-08-23 ENCOUNTER — HOSPITAL ENCOUNTER (OUTPATIENT)
Dept: RADIATION THERAPY | Age: 51
Discharge: HOME | End: 2021-08-23

## 2021-08-24 ENCOUNTER — HOSPITAL ENCOUNTER (OUTPATIENT)
Dept: RADIATION THERAPY | Age: 51
Discharge: HOME | End: 2021-08-24

## 2021-08-25 ENCOUNTER — HOSPITAL ENCOUNTER (OUTPATIENT)
Dept: RADIATION THERAPY | Age: 51
Discharge: HOME | End: 2021-08-25

## 2021-08-26 ENCOUNTER — HOSPITAL ENCOUNTER (OUTPATIENT)
Dept: RADIATION THERAPY | Age: 51
Discharge: HOME | End: 2021-08-26

## 2021-08-27 ENCOUNTER — HOSPITAL ENCOUNTER (OUTPATIENT)
Dept: RADIATION THERAPY | Age: 51
Discharge: HOME | End: 2021-08-27

## 2022-02-16 ENCOUNTER — HOSPITAL ENCOUNTER (OUTPATIENT)
Dept: RADIATION THERAPY | Age: 52
Discharge: HOME | End: 2022-02-16

## 2022-02-16 DIAGNOSIS — C61 PROSTATE CANCER (HCC): Primary | ICD-10-CM

## 2022-02-16 RX ORDER — METHENAMINE, SODIUM PHOSPHATE, MONOBASIC, MONOHYDRATE, PHENYL SALICYLATE, METHYLENE BLUE, AND HYOSCYAMINE SULFATE 118; 40.8; 36; 10; .12 MG/1; MG/1; MG/1; MG/1; MG/1
1 CAPSULE ORAL 4 TIMES DAILY
Qty: 40 CAPSULE | Refills: 5 | Status: SHIPPED | OUTPATIENT
Start: 2022-02-16

## 2022-03-18 PROBLEM — C61 PROSTATE CANCER (HCC): Status: ACTIVE | Noted: 2021-02-22

## 2022-03-19 PROBLEM — R97.21 INCREASING PROSTATE SPECIFIC ANTIGEN (PSA) LEVEL AFTER TREATMENT FOR MALIGNANT NEOPLASM OF PROSTATE: Status: ACTIVE | Noted: 2021-04-06

## 2022-03-19 PROBLEM — R07.9 CHEST PAIN: Status: ACTIVE | Noted: 2018-03-26

## 2023-04-08 NOTE — PROGRESS NOTES
Arrived from home via private vehicle.  CC of dental pain originating from the right lower molars.  Has been on amoxicillin for the past three days.  No relief currently.       Triage Assessment     Row Name 04/07/23 2056       Triage Assessment (Adult)    Airway WDL WDL       Respiratory WDL    Respiratory WDL WDL       Skin Circulation/Temperature WDL    Skin Circulation/Temperature WDL WDL       Cardiac WDL    Cardiac WDL WDL       Peripheral/Neurovascular WDL    Peripheral Neurovascular WDL WDL       Cognitive/Neuro/Behavioral WDL    Cognitive/Neuro/Behavioral WDL WDL               Clinical Pharmacy Note: IV to PO Automatic Conversion  Please note: Bard Cory Brown medication- famotidine has been changed from IV to PO based on the following critiera:    - Patient is taking scheduled oral medications  - Patient is tolerating tube feeds at goal rate or a full liquid, soft or regular diet    This IV to PO conversion is based on the [employer] approved automatic conversion policy for eligible patients. Please call with questions.

## (undated) DEVICE — ARM DRAPE

## (undated) DEVICE — Device

## (undated) DEVICE — DERMABOND SKIN ADH 0.7ML -- DERMABOND ADVANCED 12/BX

## (undated) DEVICE — COVER MPLR TIP CRV SCIS ACC DA VINCI

## (undated) DEVICE — PACK,BASIC,SIRUS,V: Brand: MEDLINE

## (undated) DEVICE — SUTURE V-LOC 180 SZ 3-0 L6IN ABSRB VLT CV-23 L17MM 1/2 CIR VLOCM0804

## (undated) DEVICE — PAD BD MATTRESS 73X32 IN STD CONVOLUTED FOAM LTX FREE

## (undated) DEVICE — ELECTRO LUBE IS A SINGLE PATIENT USE DEVICE THAT IS INTENDED TO BE USED ON ELECTROSURGICAL ELECTRODES TO REDUCE STICKING.: Brand: KEY SURGICAL ELECTRO LUBE

## (undated) DEVICE — JELLY,LUBE,STERILE,FLIP TOP,TUBE,4-OZ: Brand: MEDLINE

## (undated) DEVICE — SURGICAL PROCEDURE PACK PROSTCTMY DAVINCI BSR RICHMOND

## (undated) DEVICE — SOLUTION IRRIGATION H2O 0797305] ICU MEDICAL INC]

## (undated) DEVICE — BAG SPEC REM 224ML W4XL6IN DIA10MM 1 HND GYN DISP ENDOPCH

## (undated) DEVICE — SUTURE VCRL SZ 0 L36IN ABSRB VLT L36MM CT-1 1/2 CIR J346H

## (undated) DEVICE — VISUALIZATION SYSTEM: Brand: CLEARIFY

## (undated) DEVICE — INSUFFLATION NEEDLE TO ESTABLISH PNEUMOPERITONEUM.: Brand: INSUFFLATION NEEDLE

## (undated) DEVICE — TRAY PREP DRY W/ PREM GLV 2 APPL 6 SPNG 2 UNDPD 1 OVERWRAP

## (undated) DEVICE — INFECTION CONTROL KIT SYS

## (undated) DEVICE — STERILE POLYISOPRENE POWDER-FREE SURGICAL GLOVES WITH EMOLLIENT COATING: Brand: PROTEXIS

## (undated) DEVICE — SEAL UNIV 5-8MM DISP BX/10 -- DA VINCI XI - SNGL USE

## (undated) DEVICE — SUTURE MCRYL SZ 3-0 L27IN ABSRB UD L19MM PS-2 3/8 CIR PRIM Y427H

## (undated) DEVICE — TAPE,CLOTH/SILK,CURAD,3"X10YD,LF,40/CS: Brand: CURAD

## (undated) DEVICE — AIRSEAL 12 MM ACCESS PORT AND PALM GRIP OBTURATOR WITH BLADELESS OPTICAL TIP, 120 MM LENGTH: Brand: AIRSEAL

## (undated) DEVICE — BLADE ASSEMB CLP HAIR FINE --

## (undated) DEVICE — REM POLYHESIVE ADULT PATIENT RETURN ELECTRODE: Brand: VALLEYLAB

## (undated) DEVICE — DEVICE SECUREMENT 1/32IN POLYETH FOAM F ANCHR URIN CATH

## (undated) DEVICE — GARMENT,MEDLINE,DVT,INT,CALF,MED, GEN2: Brand: MEDLINE

## (undated) DEVICE — BLADELESS OBTURATOR: Brand: WECK VISTA

## (undated) DEVICE — SUTURE PDS II SZ 1 L27IN ABSRB VLT CT-1 L36MM 1/2 CIR Z341H

## (undated) DEVICE — TRI-LUMEN FILTERED TUBE SET WITH ACTIVATED CHARCOAL FILTER: Brand: AIRSEAL

## (undated) DEVICE — STRAP,POSITIONING,KNEE/BODY,FOAM,4X60": Brand: MEDLINE

## (undated) DEVICE — PREP SKN CHLRAPRP APL 26ML STR --

## (undated) DEVICE — SYR LR LCK 1ML GRAD NSAF 30ML --